# Patient Record
Sex: FEMALE | Race: WHITE | Employment: FULL TIME | URBAN - METROPOLITAN AREA
[De-identification: names, ages, dates, MRNs, and addresses within clinical notes are randomized per-mention and may not be internally consistent; named-entity substitution may affect disease eponyms.]

---

## 2018-09-22 ENCOUNTER — OFFICE VISIT (OUTPATIENT)
Dept: OBGYN CLINIC | Facility: CLINIC | Age: 38
End: 2018-09-22
Payer: COMMERCIAL

## 2018-09-22 VITALS
HEART RATE: 103 BPM | HEIGHT: 63 IN | SYSTOLIC BLOOD PRESSURE: 121 MMHG | DIASTOLIC BLOOD PRESSURE: 78 MMHG | BODY MASS INDEX: 23.21 KG/M2 | WEIGHT: 131 LBS

## 2018-09-22 DIAGNOSIS — O09.521 ELDERLY MULTIGRAVIDA IN FIRST TRIMESTER: ICD-10-CM

## 2018-09-22 DIAGNOSIS — Z71.89 PRENATAL CONSULT: Primary | ICD-10-CM

## 2018-09-22 LAB
CONTROL LINE PRESENT WITH A CLEAR BACKGROUND (YES/NO): YES YES/NO
KIT LOT #: 0 NUMERIC
PREGNANCY TEST, URINE: POSITIVE

## 2018-09-22 PROCEDURE — 81025 URINE PREGNANCY TEST: CPT | Performed by: ADVANCED PRACTICE MIDWIFE

## 2018-09-22 NOTE — PROGRESS NOTES
Pt is currently 10 wks pregnancy. Pt. denies any medical conditions. Desires CNM care. Midwifery care & philosophy discussed. Practice guidelines discussed. Pt is appropriate for RN visit.

## 2018-09-25 ENCOUNTER — NURSE ONLY (OUTPATIENT)
Dept: OBGYN CLINIC | Facility: CLINIC | Age: 38
End: 2018-09-25
Payer: COMMERCIAL

## 2018-09-25 VITALS — BODY MASS INDEX: 23 KG/M2 | WEIGHT: 131.19 LBS

## 2018-09-25 DIAGNOSIS — O09.521 ELDERLY MULTIGRAVIDA IN FIRST TRIMESTER: Primary | ICD-10-CM

## 2018-09-25 DIAGNOSIS — Z34.90 PREGNANCY, UNSPECIFIED GESTATIONAL AGE: ICD-10-CM

## 2018-09-25 NOTE — PROGRESS NOTES
Nurse education complete & information given to pt. Sabine Leija at visit. Pt AMA. TSH, 1hr gtt, HA1C ordered w/ NOB labs. Cell free DNA desired, order entered & phone number to schedule given to pt.  Pt states last pap was 5 years ago & will obtain copy of resu

## 2018-09-29 ENCOUNTER — LAB ENCOUNTER (OUTPATIENT)
Dept: LAB | Age: 38
End: 2018-09-29
Attending: ADVANCED PRACTICE MIDWIFE
Payer: COMMERCIAL

## 2018-09-29 DIAGNOSIS — O09.521 ELDERLY MULTIGRAVIDA IN FIRST TRIMESTER: ICD-10-CM

## 2018-09-29 DIAGNOSIS — Z34.90 PREGNANCY, UNSPECIFIED GESTATIONAL AGE: ICD-10-CM

## 2018-09-29 PROCEDURE — 87186 SC STD MICRODIL/AGAR DIL: CPT

## 2018-09-29 PROCEDURE — 87088 URINE BACTERIA CULTURE: CPT

## 2018-09-29 PROCEDURE — 86803 HEPATITIS C AB TEST: CPT

## 2018-09-29 PROCEDURE — 82950 GLUCOSE TEST: CPT

## 2018-09-29 PROCEDURE — 83036 HEMOGLOBIN GLYCOSYLATED A1C: CPT

## 2018-09-29 PROCEDURE — 36415 COLL VENOUS BLD VENIPUNCTURE: CPT

## 2018-09-29 PROCEDURE — 87086 URINE CULTURE/COLONY COUNT: CPT

## 2018-09-29 PROCEDURE — 86900 BLOOD TYPING SEROLOGIC ABO: CPT

## 2018-09-29 PROCEDURE — 86780 TREPONEMA PALLIDUM: CPT

## 2018-09-29 PROCEDURE — 87340 HEPATITIS B SURFACE AG IA: CPT

## 2018-09-29 PROCEDURE — 86762 RUBELLA ANTIBODY: CPT

## 2018-09-29 PROCEDURE — 86850 RBC ANTIBODY SCREEN: CPT

## 2018-09-29 PROCEDURE — 84443 ASSAY THYROID STIM HORMONE: CPT

## 2018-09-29 PROCEDURE — 86901 BLOOD TYPING SEROLOGIC RH(D): CPT

## 2018-09-29 PROCEDURE — 85025 COMPLETE CBC W/AUTO DIFF WBC: CPT

## 2018-09-29 PROCEDURE — 87389 HIV-1 AG W/HIV-1&-2 AB AG IA: CPT

## 2018-10-02 ENCOUNTER — TELEPHONE (OUTPATIENT)
Dept: OBGYN CLINIC | Facility: CLINIC | Age: 38
End: 2018-10-02

## 2018-10-02 DIAGNOSIS — R79.89 ELEVATED TSH: Primary | ICD-10-CM

## 2018-10-02 PROBLEM — O23.41 URINARY TRACT INFECTION IN MOTHER DURING FIRST TRIMESTER OF PREGNANCY: Status: ACTIVE | Noted: 2018-10-02

## 2018-10-02 PROBLEM — Z67.91 RH NEGATIVE STATE IN ANTEPARTUM PERIOD: Status: ACTIVE | Noted: 2018-10-02

## 2018-10-02 PROBLEM — O26.899 RH NEGATIVE STATE IN ANTEPARTUM PERIOD: Status: ACTIVE | Noted: 2018-10-02

## 2018-10-02 RX ORDER — CEPHALEXIN 500 MG/1
500 CAPSULE ORAL 4 TIMES DAILY
Qty: 28 CAPSULE | Refills: 0 | Status: SHIPPED | OUTPATIENT
Start: 2018-10-02 | End: 2018-10-03

## 2018-10-02 NOTE — TELEPHONE ENCOUNTER
----- [facility] sent at 10/2/2018 10:46 AM CDT -----  Pt has UTI, I have sent in Rx for Keflex. She will need CRAIG after finishes Rx. Also her TSH is high and needs to see Endocrinology.  She is Rh negative so will need Rhogam at 2

## 2018-10-02 NOTE — TELEPHONE ENCOUNTER
----- Message from Floyd Bazzi CNM sent at 10/2/2018 10:46 AM CDT -----  Pt has UTI, I have sent in Rx for Keflex. She will need CRAIG after finishes Rx. Also her TSH is high and needs to see Endocrinology.  She is Rh negative so will need Rhogam at 2

## 2018-10-03 ENCOUNTER — INITIAL PRENATAL (OUTPATIENT)
Dept: OBGYN CLINIC | Facility: CLINIC | Age: 38
End: 2018-10-03
Payer: COMMERCIAL

## 2018-10-03 VITALS
WEIGHT: 134 LBS | BODY MASS INDEX: 24 KG/M2 | SYSTOLIC BLOOD PRESSURE: 96 MMHG | DIASTOLIC BLOOD PRESSURE: 62 MMHG | HEART RATE: 73 BPM

## 2018-10-03 DIAGNOSIS — O09.529 ANTEPARTUM MULTIGRAVIDA OF ADVANCED MATERNAL AGE: ICD-10-CM

## 2018-10-03 DIAGNOSIS — E07.9 THYROID DYSFUNCTION IN PREGNANCY, ANTEPARTUM, FIRST TRIMESTER: ICD-10-CM

## 2018-10-03 DIAGNOSIS — Z34.01 ENCOUNTER FOR SUPERVISION OF NORMAL FIRST PREGNANCY IN FIRST TRIMESTER: Primary | ICD-10-CM

## 2018-10-03 DIAGNOSIS — O99.281 THYROID DYSFUNCTION IN PREGNANCY, ANTEPARTUM, FIRST TRIMESTER: ICD-10-CM

## 2018-10-03 DIAGNOSIS — Z11.3 SCREEN FOR STD (SEXUALLY TRANSMITTED DISEASE): ICD-10-CM

## 2018-10-03 DIAGNOSIS — Z12.4 SCREENING FOR MALIGNANT NEOPLASM OF CERVIX: ICD-10-CM

## 2018-10-03 PROCEDURE — 81002 URINALYSIS NONAUTO W/O SCOPE: CPT | Performed by: ADVANCED PRACTICE MIDWIFE

## 2018-10-03 PROCEDURE — 90686 IIV4 VACC NO PRSV 0.5 ML IM: CPT | Performed by: ADVANCED PRACTICE MIDWIFE

## 2018-10-03 PROCEDURE — 90471 IMMUNIZATION ADMIN: CPT | Performed by: ADVANCED PRACTICE MIDWIFE

## 2018-10-03 NOTE — TELEPHONE ENCOUNTER
Unable to lm, mailbox full. Pt has appt w/ MES today.  Info added to appt notes for MES to discuss w/ pt

## 2018-10-05 NOTE — PROGRESS NOTES
NOB labs reviewed. Urine culture results reviewed- pt to start abx. TSH reviewed- referral to endo. RH neg-pt understands management. AMA discuissed and genetic screen and practice heladio - has M appt.   All questions answered  Warning signs revie

## 2018-10-08 ENCOUNTER — TELEPHONE (OUTPATIENT)
Dept: PERINATAL CARE | Facility: HOSPITAL | Age: 38
End: 2018-10-08

## 2018-10-10 ENCOUNTER — HOSPITAL ENCOUNTER (OUTPATIENT)
Dept: PERINATAL CARE | Facility: HOSPITAL | Age: 38
Discharge: HOME | End: 2018-10-10
Attending: ADVANCED PRACTICE MIDWIFE
Payer: COMMERCIAL

## 2018-10-10 ENCOUNTER — HOSPITAL ENCOUNTER (OUTPATIENT)
Dept: PERINATAL CARE | Facility: HOSPITAL | Age: 38
Discharge: HOME | End: 2018-10-10
Attending: OBSTETRICS & GYNECOLOGY
Payer: COMMERCIAL

## 2018-10-10 VITALS — DIASTOLIC BLOOD PRESSURE: 68 MMHG | SYSTOLIC BLOOD PRESSURE: 124 MMHG | HEART RATE: 82 BPM

## 2018-10-10 DIAGNOSIS — Z36.9 FIRST TRIMESTER SCREENING: Primary | ICD-10-CM

## 2018-10-10 DIAGNOSIS — O09.511 AMA (ADVANCED MATERNAL AGE) PRIMIGRAVIDA 35+, FIRST TRIMESTER: ICD-10-CM

## 2018-10-10 DIAGNOSIS — Z36.9 FIRST TRIMESTER SCREENING: ICD-10-CM

## 2018-10-10 PROCEDURE — 76813 OB US NUCHAL MEAS 1 GEST: CPT | Performed by: OBSTETRICS & GYNECOLOGY

## 2018-10-10 PROCEDURE — 99243 OFF/OP CNSLTJ NEW/EST LOW 30: CPT | Performed by: OBSTETRICS & GYNECOLOGY

## 2018-10-10 NOTE — PROGRESS NOTES
Reason for Consult:   Dear Ms. Nadja Ortiz,    Thank you for requesting ultrasound evaluation and maternal fetal medicine consultation on iLnda Coles.  As you are aware she is a 45year old female with a Garcia pregnancy at 13w0d.   A maternal-fet of women aged 28 to 39, but also to later marriage, second marriage, the availability of better contraceptive options, and wider opportunities for further education and career advancement.     Studies have generally shown good pregnancy outcomes in women of medical problems complicating pregnancy are hypertension and diabetes.    The incidence of preeclampsia in the general obstetric population is 3 to 4 percent; this increases to 5 to 10 percent in women over age 36 and is as high as 28 percent in women over in PACS  FIRST TRIMESTER SCREENING:    The [address] is consistent with the gestational age. The nuchal translucency measures  1.4 mm. This is within normal limits. The nasal bone is present. Fetus: arms and legs seen suboptimally.  Fetal head/skull and abdome

## 2018-10-15 ENCOUNTER — TELEPHONE (OUTPATIENT)
Dept: PERINATAL CARE | Facility: HOSPITAL | Age: 38
End: 2018-10-15

## 2018-10-15 NOTE — TELEPHONE ENCOUNTER
Recd FTS results for [facility] and Dr Anisa Reis reviewed and signed off  Spoke with Monet Bourne and informed her that the probability after screening for Trisomy 13,18 and 21 is less than 1/10,000. This is low probability. Pt verbalized understanding.   Adarsh He

## 2018-10-26 ENCOUNTER — APPOINTMENT (OUTPATIENT)
Dept: LAB | Facility: HOSPITAL | Age: 38
End: 2018-10-26
Attending: INTERNAL MEDICINE
Payer: COMMERCIAL

## 2018-10-26 ENCOUNTER — OFFICE VISIT (OUTPATIENT)
Dept: ENDOCRINOLOGY CLINIC | Facility: CLINIC | Age: 38
End: 2018-10-26
Payer: COMMERCIAL

## 2018-10-26 ENCOUNTER — TELEPHONE (OUTPATIENT)
Dept: ENDOCRINOLOGY CLINIC | Facility: CLINIC | Age: 38
End: 2018-10-26

## 2018-10-26 VITALS
SYSTOLIC BLOOD PRESSURE: 120 MMHG | WEIGHT: 135.81 LBS | DIASTOLIC BLOOD PRESSURE: 76 MMHG | HEART RATE: 86 BPM | BODY MASS INDEX: 24 KG/M2

## 2018-10-26 DIAGNOSIS — O99.282 THYROID DISEASE DURING PREGNANCY IN SECOND TRIMESTER: Primary | ICD-10-CM

## 2018-10-26 DIAGNOSIS — O99.282 THYROID DISEASE DURING PREGNANCY IN SECOND TRIMESTER: ICD-10-CM

## 2018-10-26 DIAGNOSIS — E07.9 THYROID DISEASE DURING PREGNANCY IN SECOND TRIMESTER: Primary | ICD-10-CM

## 2018-10-26 DIAGNOSIS — E07.9 THYROID DISEASE DURING PREGNANCY IN SECOND TRIMESTER: ICD-10-CM

## 2018-10-26 PROCEDURE — 99243 OFF/OP CNSLTJ NEW/EST LOW 30: CPT | Performed by: INTERNAL MEDICINE

## 2018-10-26 PROCEDURE — 99212 OFFICE O/P EST SF 10 MIN: CPT | Performed by: INTERNAL MEDICINE

## 2018-10-26 PROCEDURE — 86376 MICROSOMAL ANTIBODY EACH: CPT

## 2018-10-26 PROCEDURE — 36415 COLL VENOUS BLD VENIPUNCTURE: CPT

## 2018-10-26 NOTE — TELEPHONE ENCOUNTER
[facility] calling for pt who is there now. Pls place orders, pt was seen today, will attempt to transfer, if not pls call at:[phone],thanks.

## 2018-10-26 NOTE — H&P
New Patient Evaluation - History and Physical    CONSULT - Reason for Visit:  Thyroid disease in  Pregnancy  Requesting Provider: Marilynn Shankar CNM    CHIEF COMPLAINT:  Patient presents with:  Consult: Pt had labs done 10/03/18.  Pt states high Thyroi Caffeine Concern: No        Occupational Exposure: Not Asked        Hobby Hazards: No        Sleep Concern: Not Asked        Stress Concern: No        Weight Concern: Not Asked        Special Diet: Yes          vegetarian        Back Care: Not Asked tenderness  HEMATOLOGIC/LYMPHATICS:  no cervical lymphadenopathy and no supraclavicular lymphadenopathy  LUNGS: clear to auscultation bilaterally, no crackles or wheezing  CARDIOVASCULAR:  regular rate and rhythm, normal S1 and S2  ABDOMEN:  normal bowel s

## 2018-10-27 ENCOUNTER — TELEPHONE (OUTPATIENT)
Dept: ENDOCRINOLOGY CLINIC | Facility: CLINIC | Age: 38
End: 2018-10-27

## 2018-10-27 DIAGNOSIS — E03.9 HYPOTHYROID IN PREGNANCY, ANTEPARTUM, UNSPECIFIED TRIMESTER: Primary | ICD-10-CM

## 2018-10-27 DIAGNOSIS — O99.280 HYPOTHYROID IN PREGNANCY, ANTEPARTUM, UNSPECIFIED TRIMESTER: Primary | ICD-10-CM

## 2018-10-27 NOTE — TELEPHONE ENCOUNTER
Patient is pregnant with a slight TSH elevation  Her TPO AB is positive  Hence I do recommend a small dose of LT 4 during pregnancy  LT 4 25 mcg daily    TSH and Free T4 in 4 weeks  Thanks

## 2018-10-30 RX ORDER — LEVOTHYROXINE SODIUM 0.03 MG/1
25 TABLET ORAL
Qty: 30 TABLET | Refills: 2 | Status: SHIPPED | OUTPATIENT
Start: 2018-10-30 | End: 2019-02-07

## 2018-10-30 NOTE — TELEPHONE ENCOUNTER
Spoke with patient and discussed note below from provider. Pt verbalized understanding to start levothyroxine 25 mcg daily 30-60 mins before bkfst and 4 hrs apart from prenatal vitamin. Pt verbalized understanding to repeat thyroid labs in 4 wks.  Med and L

## 2018-11-03 ENCOUNTER — ROUTINE PRENATAL (OUTPATIENT)
Dept: OBGYN CLINIC | Facility: CLINIC | Age: 38
End: 2018-11-03
Payer: COMMERCIAL

## 2018-11-03 ENCOUNTER — APPOINTMENT (OUTPATIENT)
Dept: LAB | Facility: HOSPITAL | Age: 38
End: 2018-11-03
Attending: ADVANCED PRACTICE MIDWIFE
Payer: COMMERCIAL

## 2018-11-03 VITALS
HEIGHT: 63 IN | WEIGHT: 138.13 LBS | SYSTOLIC BLOOD PRESSURE: 115 MMHG | HEART RATE: 90 BPM | DIASTOLIC BLOOD PRESSURE: 71 MMHG | BODY MASS INDEX: 24.47 KG/M2

## 2018-11-03 DIAGNOSIS — Z34.02 ENCOUNTER FOR SUPERVISION OF NORMAL FIRST PREGNANCY IN SECOND TRIMESTER: ICD-10-CM

## 2018-11-03 DIAGNOSIS — Z34.02 ENCOUNTER FOR SUPERVISION OF NORMAL FIRST PREGNANCY IN SECOND TRIMESTER: Primary | ICD-10-CM

## 2018-11-03 PROCEDURE — 81002 URINALYSIS NONAUTO W/O SCOPE: CPT | Performed by: ADVANCED PRACTICE MIDWIFE

## 2018-11-03 PROCEDURE — 82105 ALPHA-FETOPROTEIN SERUM: CPT

## 2018-11-03 PROCEDURE — 87086 URINE CULTURE/COLONY COUNT: CPT

## 2018-11-03 PROCEDURE — 36415 COLL VENOUS BLD VENIPUNCTURE: CPT

## 2018-11-04 NOTE — PROGRESS NOTES
Feels well, denies any cramping or VB. Denies dysuria, urinary frequency, or hematuria. UTI CRAIG today. Saw endo and is now taking 25mcg Synthroid daily. Endo recommends repeat thyroid labs 4 weeks from the last one. Desires AFP, will go to lab today.  Bunnie Hashimoto

## 2018-11-05 ENCOUNTER — TELEPHONE (OUTPATIENT)
Dept: OBGYN CLINIC | Facility: CLINIC | Age: 38
End: 2018-11-05

## 2018-11-05 DIAGNOSIS — O23.40 URINARY TRACT INFECTION IN MOTHER DURING PREGNANCY, ANTEPARTUM: Primary | ICD-10-CM

## 2018-11-05 NOTE — TELEPHONE ENCOUNTER
----- Message from Pippa Jaime CNM sent at 11/4/2018  5:22 PM CST -----  Please let pt know that urine Cx looks contaminated. I would like her to repeat. Looks like she lives in [place], so she can use that lab if it is more convenient.  Thanks, MJ

## 2018-11-08 ENCOUNTER — TELEPHONE (OUTPATIENT)
Dept: ENDOCRINOLOGY CLINIC | Facility: CLINIC | Age: 38
End: 2018-11-08

## 2018-11-08 NOTE — TELEPHONE ENCOUNTER
Spoke with patient. She is about 16 weeks pregnant. She was started on LT4 25mcg on 10/27. Per AM TPO antibody was positive with slight TSH elevated. Patient has noticed increase in irritability and anxiety since starting medication.  Denies any other symp

## 2018-11-08 NOTE — TELEPHONE ENCOUNTER
There are two options   Stop and repeat labs in 2-3 weeks  Change to every other day dosing and repeat labs in 3-4 weeks. If she experiences SE on every other day dosing, will have to stop.    Either way, david book an apt according to when she will be doigracie

## 2018-11-09 ENCOUNTER — APPOINTMENT (OUTPATIENT)
Dept: LAB | Age: 38
End: 2018-11-09
Attending: ADVANCED PRACTICE MIDWIFE
Payer: COMMERCIAL

## 2018-11-09 ENCOUNTER — TELEPHONE (OUTPATIENT)
Dept: OBGYN CLINIC | Facility: CLINIC | Age: 38
End: 2018-11-09

## 2018-11-09 DIAGNOSIS — E03.9 HYPOTHYROID IN PREGNANCY, ANTEPARTUM, UNSPECIFIED TRIMESTER: ICD-10-CM

## 2018-11-09 DIAGNOSIS — O23.40 URINARY TRACT INFECTION IN MOTHER DURING PREGNANCY, ANTEPARTUM: ICD-10-CM

## 2018-11-09 DIAGNOSIS — O99.280 HYPOTHYROID IN PREGNANCY, ANTEPARTUM, UNSPECIFIED TRIMESTER: ICD-10-CM

## 2018-11-09 PROCEDURE — 87086 URINE CULTURE/COLONY COUNT: CPT

## 2018-11-09 NOTE — TELEPHONE ENCOUNTER
----- Message from Floyd Bazzi CNM sent at 11/9/2018 11:03 AM CST -----  Please notify of negative AFP.  Thanks, MJ

## 2018-11-09 NOTE — TELEPHONE ENCOUNTER
Spoke with patient - she would like to try every other day dosing. Labs are already in her chart. She will call and let us know if symptoms return. She verbalizes understanding of plan to repeat thyroid labs.

## 2018-11-12 ENCOUNTER — TELEPHONE (OUTPATIENT)
Dept: OBGYN CLINIC | Facility: CLINIC | Age: 38
End: 2018-11-12

## 2018-11-12 NOTE — TELEPHONE ENCOUNTER
----- Message from DAVID Hodge sent at 11/11/2018  5:29 AM CST -----  Please notify that urine test of cure is negative

## 2018-11-21 ENCOUNTER — APPOINTMENT (OUTPATIENT)
Dept: LAB | Age: 38
End: 2018-11-21
Attending: INTERNAL MEDICINE
Payer: COMMERCIAL

## 2018-11-21 PROCEDURE — 36415 COLL VENOUS BLD VENIPUNCTURE: CPT

## 2018-11-21 PROCEDURE — 84443 ASSAY THYROID STIM HORMONE: CPT

## 2018-11-21 PROCEDURE — 84439 ASSAY OF FREE THYROXINE: CPT

## 2018-11-23 ENCOUNTER — TELEPHONE (OUTPATIENT)
Dept: MEDSURG UNIT | Facility: HOSPITAL | Age: 38
End: 2018-11-23

## 2018-11-23 DIAGNOSIS — E03.9 HYPOTHYROIDISM, UNSPECIFIED TYPE: Primary | ICD-10-CM

## 2018-11-23 NOTE — TELEPHONE ENCOUNTER
Spoke with patient. And discussed below. She actually stopped medication completely and is not taking at all. States LT4 makes her feel anxious and irritable. Advised that for safety of baby she does need to take medication.  Will confirm with AM if she wou

## 2018-11-23 NOTE — TELEPHONE ENCOUNTER
Pregnant patient   TSH is high at 3.5 ( for pregnancy)  She was started on LT 4 25 mcg daily  Did not feel good and dose was changed to every other day.    Since TSH is high that goal, recommend that she try to take the medication 5 days a week  Call if she

## 2018-11-23 NOTE — TELEPHONE ENCOUNTER
Every other day .   She could start with twice a week for one week, if she tokeartes it ca arguello up to every other day from second week  Thanks

## 2018-11-23 NOTE — TELEPHONE ENCOUNTER
Spoke with patient. She is very hesitant to restart LT4 because of previous SE. RN advised patient that during pregnancy thyroid levels are closely monitored and taking medication is for the safety of the pregnancy.  Patient states she will try LT4 twice a

## 2018-11-27 NOTE — PROGRESS NOTES
Brice Gil  Dear Dr. Dariusz Israel,     Thank you for requesting ultrasound evaluation and maternal fetal medicine consultation on your patient Terry Cm.  As you are aware she is a 45year old female  with a Sin pregnancy surveillance is advised for these patients including a comprehensive ultrasound to assess for fetal malformations (at 20 weeks) and a third trimester ultrasound assessment for fetal growth (at 32 weeks).  In addition, weekly NST's (initiating at 3 antepartum testing in older women does increase the chance that a women will be induced (71 inductions per fetal death averted) and thereby increases her risk of having a  delivery, only 14 additional cesareans would need to be performed to avert o gestational hypertension  ·Placental abruption  ·Nonreassuring fetal heart rate tracing  · delivery, including very  delivery (before 32 weeks)  ·Low birth weight  ·Increased rate of  section  · morbidity and mortality  ·Neur ultrasound  · Thyroid dysfunction  · Advanced maternal age, low risk cfDNA screen     RECOMMENDATIONS:  · Continue care with MsDiana Virginia Barraza  · Weekly NST at 36 weeks  · Third trimester US  · Thyroid labs to be managed by Dr. Bacilio Tenorio     Thank you for allo

## 2018-11-30 ENCOUNTER — HOSPITAL ENCOUNTER (OUTPATIENT)
Dept: PERINATAL CARE | Facility: HOSPITAL | Age: 38
Discharge: HOME | End: 2018-11-30
Attending: OBSTETRICS & GYNECOLOGY
Payer: COMMERCIAL

## 2018-11-30 ENCOUNTER — HOSPITAL ENCOUNTER (OUTPATIENT)
Dept: PERINATAL CARE | Facility: HOSPITAL | Age: 38
Discharge: HOME | End: 2018-11-30
Attending: ADVANCED PRACTICE MIDWIFE
Payer: COMMERCIAL

## 2018-11-30 VITALS
HEART RATE: 104 BPM | SYSTOLIC BLOOD PRESSURE: 115 MMHG | WEIGHT: 138 LBS | BODY MASS INDEX: 24 KG/M2 | DIASTOLIC BLOOD PRESSURE: 79 MMHG

## 2018-11-30 DIAGNOSIS — O26.899 RH NEGATIVE STATE IN ANTEPARTUM PERIOD: ICD-10-CM

## 2018-11-30 DIAGNOSIS — O09.529 ANTEPARTUM MULTIGRAVIDA OF ADVANCED MATERNAL AGE: Primary | ICD-10-CM

## 2018-11-30 DIAGNOSIS — O09.512 PRIMIGRAVIDA OF ADVANCED MATERNAL AGE IN SECOND TRIMESTER: ICD-10-CM

## 2018-11-30 DIAGNOSIS — R79.89 ELEVATED TSH: ICD-10-CM

## 2018-11-30 DIAGNOSIS — Z36.3 ENCOUNTER FOR ANTENATAL SCREENING FOR MALFORMATION USING ULTRASOUND: ICD-10-CM

## 2018-11-30 DIAGNOSIS — O09.529 ANTEPARTUM MULTIGRAVIDA OF ADVANCED MATERNAL AGE: ICD-10-CM

## 2018-11-30 DIAGNOSIS — Z67.91 RH NEGATIVE STATE IN ANTEPARTUM PERIOD: ICD-10-CM

## 2018-11-30 PROCEDURE — 99215 OFFICE O/P EST HI 40 MIN: CPT | Performed by: OBSTETRICS & GYNECOLOGY

## 2018-11-30 PROCEDURE — 76811 OB US DETAILED SNGL FETUS: CPT | Performed by: OBSTETRICS & GYNECOLOGY

## 2018-12-07 ENCOUNTER — ROUTINE PRENATAL (OUTPATIENT)
Dept: OBGYN CLINIC | Facility: CLINIC | Age: 38
End: 2018-12-07
Payer: COMMERCIAL

## 2018-12-07 VITALS
HEART RATE: 97 BPM | BODY MASS INDEX: 25 KG/M2 | DIASTOLIC BLOOD PRESSURE: 79 MMHG | SYSTOLIC BLOOD PRESSURE: 120 MMHG | WEIGHT: 141.63 LBS

## 2018-12-07 DIAGNOSIS — Z34.02 ENCOUNTER FOR SUPERVISION OF NORMAL FIRST PREGNANCY IN SECOND TRIMESTER: Primary | ICD-10-CM

## 2018-12-07 PROCEDURE — 81002 URINALYSIS NONAUTO W/O SCOPE: CPT | Performed by: ADVANCED PRACTICE MIDWIFE

## 2018-12-07 NOTE — PROGRESS NOTES
Thinks feels some movement. Denies pain or bleeding. Was unable to tolerate synthroid, made her heart race and unable to sleep. Talked with Dr Burgess Bañuelos who recommended trying every other day or twice weekly.  She was still unable to tolerate so stopped med

## 2018-12-11 ENCOUNTER — TELEPHONE (OUTPATIENT)
Dept: OBGYN CLINIC | Facility: CLINIC | Age: 38
End: 2018-12-11

## 2018-12-11 DIAGNOSIS — E03.9 HYPOTHYROIDISM, UNSPECIFIED TYPE: Primary | ICD-10-CM

## 2018-12-21 ENCOUNTER — APPOINTMENT (OUTPATIENT)
Dept: LAB | Age: 38
End: 2018-12-21
Attending: INTERNAL MEDICINE
Payer: COMMERCIAL

## 2018-12-21 DIAGNOSIS — E03.9 HYPOTHYROIDISM, UNSPECIFIED TYPE: ICD-10-CM

## 2018-12-21 PROCEDURE — 84439 ASSAY OF FREE THYROXINE: CPT

## 2018-12-21 PROCEDURE — 84443 ASSAY THYROID STIM HORMONE: CPT

## 2018-12-21 PROCEDURE — 36415 COLL VENOUS BLD VENIPUNCTURE: CPT

## 2018-12-24 ENCOUNTER — TELEPHONE (OUTPATIENT)
Dept: ENDOCRINOLOGY CLINIC | Facility: CLINIC | Age: 38
End: 2018-12-24

## 2018-12-24 NOTE — TELEPHONE ENCOUNTER
Endo staff:  Seems like she had decided to take LT 4 a few days a week  Recent TSH is 3.1 which is okay for third trimester. Ideally, I recommend LT 4 during pregnancy with high TSH and keeping TSH under 3 in T2 and T3. However, patient is hesitant.

## 2019-01-07 NOTE — TELEPHONE ENCOUNTER
RN spoke with patient and discussed note below from provider. RN advised pt that TSH 3.1 on 12/21/18 and Dr. Burgess Bañuelos recommends TSH less than 3.0 during pregnancy.      Pt states she is not currently taking levothyroxine and she is frankly not going to ta

## 2019-01-10 ENCOUNTER — ROUTINE PRENATAL (OUTPATIENT)
Dept: OBGYN CLINIC | Facility: CLINIC | Age: 39
End: 2019-01-10
Payer: COMMERCIAL

## 2019-01-10 VITALS
SYSTOLIC BLOOD PRESSURE: 126 MMHG | DIASTOLIC BLOOD PRESSURE: 68 MMHG | HEIGHT: 63 IN | BODY MASS INDEX: 26.09 KG/M2 | HEART RATE: 80 BPM | WEIGHT: 147.25 LBS

## 2019-01-10 DIAGNOSIS — Z34.02 ENCOUNTER FOR SUPERVISION OF NORMAL FIRST PREGNANCY IN SECOND TRIMESTER: Primary | ICD-10-CM

## 2019-01-10 LAB
APPEARANCE: CLEAR
MULTISTIX LOT#: NORMAL NUMERIC
PH, URINE: 6 (ref 4.5–8)
SPECIFIC GRAVITY: 1.02 (ref 1–1.03)
URINE-COLOR: YELLOW
UROBILINOGEN,SEMI-QN: 0.2 MG/DL (ref 0–1.9)

## 2019-01-10 PROCEDURE — 81002 URINALYSIS NONAUTO W/O SCOPE: CPT | Performed by: ADVANCED PRACTICE MIDWIFE

## 2019-01-11 NOTE — PROGRESS NOTES
Discussed pt d/c Synthroid. Medication made her feel very off/anxious. Encouraged patient to have labs repeated with 3rd T labs. She is willing to do.   Also discussed alternatives to Synthroid if labs have become significantly abnormal, and risks to fet

## 2019-01-21 ENCOUNTER — APPOINTMENT (OUTPATIENT)
Dept: LAB | Age: 39
End: 2019-01-21
Attending: ADVANCED PRACTICE MIDWIFE
Payer: COMMERCIAL

## 2019-01-21 DIAGNOSIS — Z34.02 ENCOUNTER FOR SUPERVISION OF NORMAL FIRST PREGNANCY IN SECOND TRIMESTER: ICD-10-CM

## 2019-01-21 LAB
ANTIBODY SCREEN: NEGATIVE
ERYTHROCYTE [DISTWIDTH] IN BLOOD BY AUTOMATED COUNT: 12.5 % (ref 11–15)
GLUCOSE 1H P 50 G GLC PO SERPL-MCNC: 131 MG/DL
HCT VFR BLD AUTO: 32.8 % (ref 35–48)
HGB BLD-MCNC: 11.1 G/DL (ref 12–16)
MCH RBC QN AUTO: 30.5 PG (ref 27–32)
MCHC RBC AUTO-ENTMCNC: 34 G/DL (ref 32–37)
MCV RBC AUTO: 89.7 FL (ref 80–100)
PLATELET # BLD AUTO: 258 K/UL (ref 140–400)
PMV BLD AUTO: 8.5 FL (ref 7.4–10.3)
RBC # BLD AUTO: 3.66 M/UL (ref 3.7–5.4)
RH BLOOD TYPE: NEGATIVE
TSH SERPL-ACNC: 2.69 UIU/ML (ref 0.45–5.33)
WBC # BLD AUTO: 10.2 K/UL (ref 4–11)

## 2019-01-21 PROCEDURE — 85027 COMPLETE CBC AUTOMATED: CPT

## 2019-01-21 PROCEDURE — 86901 BLOOD TYPING SEROLOGIC RH(D): CPT

## 2019-01-21 PROCEDURE — 82950 GLUCOSE TEST: CPT

## 2019-01-21 PROCEDURE — 86850 RBC ANTIBODY SCREEN: CPT

## 2019-01-21 PROCEDURE — 36415 COLL VENOUS BLD VENIPUNCTURE: CPT

## 2019-01-21 PROCEDURE — 84443 ASSAY THYROID STIM HORMONE: CPT

## 2019-01-21 PROCEDURE — 87389 HIV-1 AG W/HIV-1&-2 AB AG IA: CPT

## 2019-01-21 PROCEDURE — 86900 BLOOD TYPING SEROLOGIC ABO: CPT

## 2019-01-21 PROCEDURE — 86780 TREPONEMA PALLIDUM: CPT

## 2019-01-22 LAB — HIV1+2 AB SERPL QL IA: NONREACTIVE

## 2019-01-23 ENCOUNTER — TELEPHONE (OUTPATIENT)
Dept: OBGYN CLINIC | Facility: CLINIC | Age: 39
End: 2019-01-23

## 2019-01-23 ENCOUNTER — ROUTINE PRENATAL (OUTPATIENT)
Dept: OBGYN CLINIC | Facility: CLINIC | Age: 39
End: 2019-01-23
Payer: COMMERCIAL

## 2019-01-23 VITALS
BODY MASS INDEX: 26.42 KG/M2 | WEIGHT: 149.13 LBS | HEART RATE: 147 BPM | HEIGHT: 63 IN | DIASTOLIC BLOOD PRESSURE: 82 MMHG | SYSTOLIC BLOOD PRESSURE: 147 MMHG

## 2019-01-23 DIAGNOSIS — Z34.03 ENCOUNTER FOR SUPERVISION OF NORMAL FIRST PREGNANCY IN THIRD TRIMESTER: Primary | ICD-10-CM

## 2019-01-23 DIAGNOSIS — R73.09 ABNORMAL GTT (GLUCOSE TOLERANCE TEST): ICD-10-CM

## 2019-01-23 LAB
APPEARANCE: CLEAR
MULTISTIX LOT#: NORMAL NUMERIC
PH, URINE: 5 (ref 4.5–8)
SPECIFIC GRAVITY: 1.03 (ref 1–1.03)
T PALLIDUM AB SER QL: NEGATIVE
URINE-COLOR: YELLOW
UROBILINOGEN,SEMI-QN: 0.2 MG/DL (ref 0–1.9)

## 2019-01-23 PROCEDURE — 81002 URINALYSIS NONAUTO W/O SCOPE: CPT | Performed by: ADVANCED PRACTICE MIDWIFE

## 2019-01-23 PROCEDURE — 90471 IMMUNIZATION ADMIN: CPT | Performed by: ADVANCED PRACTICE MIDWIFE

## 2019-01-23 PROCEDURE — 90715 TDAP VACCINE 7 YRS/> IM: CPT | Performed by: ADVANCED PRACTICE MIDWIFE

## 2019-01-23 PROCEDURE — 96372 THER/PROPH/DIAG INJ SC/IM: CPT | Performed by: ADVANCED PRACTICE MIDWIFE

## 2019-01-23 NOTE — TELEPHONE ENCOUNTER
Pt reports that when trying to set up [address] [place] [address] Soledad was told class is no longer available. Per Raven Roberts RN will forward email to Kyle Ruggiero who is in charge of classes and contact patient with correct information.

## 2019-01-23 NOTE — PROGRESS NOTES
Pt given Rhogam injection. Injection given in right upper outer quadrant. Pt tolerated well. Rhogam information sheet provided. Pt instructed to contact office with any questions or concerns. Pt verbalized understanding.

## 2019-01-23 NOTE — PROGRESS NOTES
Active fetus  No signs signs of PTL. Reviewed S&S of PTL  Discussed abnormal 1 hr result  Pt to have 3 hr GTT. Denies any palpations or SOB  Warning signs reviewed  All questions answered.   Repeat pulse 99

## 2019-01-26 ENCOUNTER — LABORATORY ENCOUNTER (OUTPATIENT)
Dept: LAB | Age: 39
End: 2019-01-26
Attending: ADVANCED PRACTICE MIDWIFE
Payer: COMMERCIAL

## 2019-01-26 DIAGNOSIS — R73.09 ABNORMAL GTT (GLUCOSE TOLERANCE TEST): ICD-10-CM

## 2019-01-26 LAB
EST. AVERAGE GLUCOSE BLD GHB EST-MCNC: 94 MG/DL (ref 68–126)
GLUCOSE 1H P GLC SERPL-MCNC: 154 MG/DL
GLUCOSE 2H P GLC SERPL-MCNC: 120 MG/DL
GLUCOSE 3H P GLC SERPL-MCNC: 107 MG/DL
GLUCOSE P FAST SERPL-MCNC: 87 MG/DL (ref 70–99)
HBA1C MFR BLD HPLC: 4.9 % (ref ?–5.7)

## 2019-01-26 PROCEDURE — 36415 COLL VENOUS BLD VENIPUNCTURE: CPT

## 2019-01-26 PROCEDURE — 83036 HEMOGLOBIN GLYCOSYLATED A1C: CPT

## 2019-01-26 PROCEDURE — 82952 GTT-ADDED SAMPLES: CPT

## 2019-01-26 PROCEDURE — 82951 GLUCOSE TOLERANCE TEST (GTT): CPT

## 2019-01-29 ENCOUNTER — TELEPHONE (OUTPATIENT)
Dept: OBGYN CLINIC | Facility: CLINIC | Age: 39
End: 2019-01-29

## 2019-02-07 ENCOUNTER — ROUTINE PRENATAL (OUTPATIENT)
Dept: OBGYN CLINIC | Facility: CLINIC | Age: 39
End: 2019-02-07
Payer: COMMERCIAL

## 2019-02-07 VITALS
SYSTOLIC BLOOD PRESSURE: 120 MMHG | BODY MASS INDEX: 27 KG/M2 | WEIGHT: 151 LBS | HEART RATE: 80 BPM | DIASTOLIC BLOOD PRESSURE: 62 MMHG

## 2019-02-07 DIAGNOSIS — Z34.03 ENCOUNTER FOR SUPERVISION OF NORMAL FIRST PREGNANCY IN THIRD TRIMESTER: Primary | ICD-10-CM

## 2019-02-07 LAB
APPEARANCE: CLEAR
MULTISTIX LOT#: NORMAL NUMERIC
PH, URINE: 7 (ref 4.5–8)
SPECIFIC GRAVITY: 1.01 (ref 1–1.03)
URINE-COLOR: YELLOW
UROBILINOGEN,SEMI-QN: 0 MG/DL (ref 0–1.9)

## 2019-02-07 PROCEDURE — 81002 URINALYSIS NONAUTO W/O SCOPE: CPT | Performed by: ADVANCED PRACTICE MIDWIFE

## 2019-02-08 NOTE — PROGRESS NOTES
Feeling well. +FM. Arm still mildly sore from TDAP at last visit, function normal.  No bruising or swelling noted. REv 28 week packet.   Pt with questions about AMA protocols

## 2019-02-11 ENCOUNTER — TELEPHONE (OUTPATIENT)
Dept: PERINATAL CARE | Facility: HOSPITAL | Age: 39
End: 2019-02-11

## 2019-02-12 ENCOUNTER — TELEPHONE (OUTPATIENT)
Dept: OBGYN CLINIC | Facility: CLINIC | Age: 39
End: 2019-02-12

## 2019-02-12 NOTE — TELEPHONE ENCOUNTER
Pt needs a letter stating pt pregnant and due date 4/17 to her job at [employer] - [phone]

## 2019-02-15 NOTE — PROGRESS NOTES
Kimberly Grier    Dear Ms. Gaviota Hayden    Thank you for requesting ultrasound evaluation and maternal fetal medicine consultation on your patient Torsten Wallis.  As you are aware she is a 45year old female [facility] with a s and coordination of care. Our discussion is summarized above. The approximate physician face-to-face time was 15 minutes.

## 2019-02-18 ENCOUNTER — HOSPITAL ENCOUNTER (OUTPATIENT)
Dept: PERINATAL CARE | Facility: HOSPITAL | Age: 39
Discharge: HOME | End: 2019-02-18
Attending: OBSTETRICS & GYNECOLOGY
Payer: COMMERCIAL

## 2019-02-18 ENCOUNTER — ROUTINE PRENATAL (OUTPATIENT)
Dept: OBGYN CLINIC | Facility: CLINIC | Age: 39
End: 2019-02-18
Payer: COMMERCIAL

## 2019-02-18 VITALS
HEIGHT: 63 IN | BODY MASS INDEX: 26.75 KG/M2 | WEIGHT: 151 LBS | SYSTOLIC BLOOD PRESSURE: 128 MMHG | HEART RATE: 114 BPM | DIASTOLIC BLOOD PRESSURE: 75 MMHG

## 2019-02-18 VITALS
BODY MASS INDEX: 27 KG/M2 | WEIGHT: 152.63 LBS | DIASTOLIC BLOOD PRESSURE: 71 MMHG | HEART RATE: 70 BPM | SYSTOLIC BLOOD PRESSURE: 108 MMHG

## 2019-02-18 DIAGNOSIS — Z34.03 ENCOUNTER FOR SUPERVISION OF NORMAL FIRST PREGNANCY IN THIRD TRIMESTER: Primary | ICD-10-CM

## 2019-02-18 DIAGNOSIS — O09.523 MULTIGRAVIDA OF ADVANCED MATERNAL AGE IN THIRD TRIMESTER: Primary | ICD-10-CM

## 2019-02-18 DIAGNOSIS — R79.89 ELEVATED TSH: ICD-10-CM

## 2019-02-18 DIAGNOSIS — O09.523 MULTIGRAVIDA OF ADVANCED MATERNAL AGE IN THIRD TRIMESTER: ICD-10-CM

## 2019-02-18 DIAGNOSIS — Z36.89 ENCOUNTER FOR ULTRASOUND TO ASSESS FETAL GROWTH: ICD-10-CM

## 2019-02-18 LAB
APPEARANCE: CLEAR
MULTISTIX LOT#: NORMAL NUMERIC
PH, URINE: 7 (ref 4.5–8)
SPECIFIC GRAVITY: 1 (ref 1–1.03)
URINE-COLOR: YELLOW
UROBILINOGEN,SEMI-QN: 0.2 MG/DL (ref 0–1.9)

## 2019-02-18 PROCEDURE — 99213 OFFICE O/P EST LOW 20 MIN: CPT | Performed by: OBSTETRICS & GYNECOLOGY

## 2019-02-18 PROCEDURE — 76805 OB US >/= 14 WKS SNGL FETUS: CPT | Performed by: OBSTETRICS & GYNECOLOGY

## 2019-02-18 PROCEDURE — 76819 FETAL BIOPHYS PROFIL W/O NST: CPT | Performed by: OBSTETRICS & GYNECOLOGY

## 2019-02-18 PROCEDURE — 81002 URINALYSIS NONAUTO W/O SCOPE: CPT | Performed by: ADVANCED PRACTICE MIDWIFE

## 2019-02-18 NOTE — PROGRESS NOTES
Feeling well, active baby. Denies s/s PTL including UCs, bleeding or LOF. Reviewed warning signs and importance of good FM.

## 2019-03-04 ENCOUNTER — ROUTINE PRENATAL (OUTPATIENT)
Dept: OBGYN CLINIC | Facility: CLINIC | Age: 39
End: 2019-03-04
Payer: COMMERCIAL

## 2019-03-04 VITALS
WEIGHT: 157.13 LBS | HEIGHT: 63 IN | SYSTOLIC BLOOD PRESSURE: 123 MMHG | DIASTOLIC BLOOD PRESSURE: 76 MMHG | BODY MASS INDEX: 27.84 KG/M2 | HEART RATE: 94 BPM

## 2019-03-04 DIAGNOSIS — Z34.03 ENCOUNTER FOR SUPERVISION OF NORMAL FIRST PREGNANCY IN THIRD TRIMESTER: Primary | ICD-10-CM

## 2019-03-04 PROCEDURE — 81002 URINALYSIS NONAUTO W/O SCOPE: CPT | Performed by: ADVANCED PRACTICE MIDWIFE

## 2019-03-07 ENCOUNTER — TELEPHONE (OUTPATIENT)
Dept: OBGYN CLINIC | Facility: CLINIC | Age: 39
End: 2019-03-07

## 2019-03-07 NOTE — TELEPHONE ENCOUNTER
PER PT REQUESTING A NOTE TO RETURN TO WORK ON MAY 31,2019 / PT STATE SHE'D DUE ON April 17TH / PLS FAX TO  DEPARTMENT # [phone] ATTN: Óscar Deng / [facility]

## 2019-03-11 NOTE — TELEPHONE ENCOUNTER
Minnie Beckman CNM  [facility]   Caller: Unspecified (4 days ago,  8:42 AM)             Yes - please clarify SAVANNAH and expected recovery 6 vs 8 weeks.       Letter created and faxed per pt's request.

## 2019-03-18 ENCOUNTER — ROUTINE PRENATAL (OUTPATIENT)
Dept: OBGYN CLINIC | Facility: CLINIC | Age: 39
End: 2019-03-18
Payer: COMMERCIAL

## 2019-03-18 VITALS
BODY MASS INDEX: 28.04 KG/M2 | HEART RATE: 96 BPM | SYSTOLIC BLOOD PRESSURE: 117 MMHG | DIASTOLIC BLOOD PRESSURE: 74 MMHG | WEIGHT: 158.25 LBS | HEIGHT: 63 IN

## 2019-03-18 DIAGNOSIS — Z34.03 ENCOUNTER FOR SUPERVISION OF NORMAL FIRST PREGNANCY IN THIRD TRIMESTER: Primary | ICD-10-CM

## 2019-03-18 LAB
APPEARANCE: CLEAR
MULTISTIX LOT#: NORMAL NUMERIC
PH, URINE: 7 (ref 4.5–8)
SPECIFIC GRAVITY: 1.01 (ref 1–1.03)
URINE-COLOR: YELLOW
UROBILINOGEN,SEMI-QN: 0.2 MG/DL (ref 0–1.9)

## 2019-03-18 PROCEDURE — 81002 URINALYSIS NONAUTO W/O SCOPE: CPT | Performed by: ADVANCED PRACTICE MIDWIFE

## 2019-03-18 NOTE — PROGRESS NOTES
Active baby. Planning vitK and EEO. GBS done today. Reviewed 36 week packet. Reviewed danger signs. NSTs with next visit.

## 2019-03-20 ENCOUNTER — TELEPHONE (OUTPATIENT)
Dept: OBGYN CLINIC | Facility: CLINIC | Age: 39
End: 2019-03-20

## 2019-03-20 NOTE — TELEPHONE ENCOUNTER
Left detailed message for pt advising of neg GBS cx per MBW & advising pt will not need abx in labor

## 2019-03-20 NOTE — TELEPHONE ENCOUNTER
----- Message from Paulino Cabezas CNM sent at 3/20/2019  2:38 PM CDT -----  GBS in neg. Please call to inform.

## 2019-03-27 ENCOUNTER — ROUTINE PRENATAL (OUTPATIENT)
Dept: OBGYN CLINIC | Facility: CLINIC | Age: 39
End: 2019-03-27
Payer: COMMERCIAL

## 2019-03-27 ENCOUNTER — HOSPITAL ENCOUNTER (OUTPATIENT)
Facility: HOSPITAL | Age: 39
Discharge: HOME | End: 2019-03-27
Attending: ADVANCED PRACTICE MIDWIFE | Admitting: OBSTETRICS & GYNECOLOGY
Payer: COMMERCIAL

## 2019-03-27 ENCOUNTER — APPOINTMENT (OUTPATIENT)
Dept: OBGYN CLINIC | Facility: HOSPITAL | Age: 39
End: 2019-03-27
Payer: COMMERCIAL

## 2019-03-27 VITALS
DIASTOLIC BLOOD PRESSURE: 71 MMHG | HEIGHT: 63 IN | BODY MASS INDEX: 28 KG/M2 | RESPIRATION RATE: 17 BRPM | SYSTOLIC BLOOD PRESSURE: 120 MMHG | HEART RATE: 96 BPM | TEMPERATURE: 98 F | WEIGHT: 158 LBS

## 2019-03-27 VITALS
DIASTOLIC BLOOD PRESSURE: 72 MMHG | HEART RATE: 97 BPM | WEIGHT: 161.38 LBS | BODY MASS INDEX: 28.59 KG/M2 | HEIGHT: 63 IN | SYSTOLIC BLOOD PRESSURE: 124 MMHG

## 2019-03-27 DIAGNOSIS — Z34.03 ENCOUNTER FOR SUPERVISION OF NORMAL FIRST PREGNANCY IN THIRD TRIMESTER: Primary | ICD-10-CM

## 2019-03-27 PROBLEM — Z34.90 PREGNANCY: Status: ACTIVE | Noted: 2019-03-27

## 2019-03-27 LAB
APPEARANCE: CLEAR
MULTISTIX LOT#: NORMAL NUMERIC
PH, URINE: 6 (ref 4.5–8)
SPECIFIC GRAVITY: 1.01 (ref 1–1.03)
URINE-COLOR: YELLOW
UROBILINOGEN,SEMI-QN: 0.2 MG/DL (ref 0–1.9)

## 2019-03-27 PROCEDURE — 59025 FETAL NON-STRESS TEST: CPT | Performed by: ADVANCED PRACTICE MIDWIFE

## 2019-03-27 PROCEDURE — 81002 URINALYSIS NONAUTO W/O SCOPE: CPT | Performed by: ADVANCED PRACTICE MIDWIFE

## 2019-03-27 NOTE — PROGRESS NOTES
Pt is a 45year old female admitted to TR1/TR1-A. Patient presents with:  Non-stress Test: AMA     Pt is  37w0d intra-uterine pregnancy. History obtained, consents signed. Oriented to room, staff, and plan of care.

## 2019-03-27 NOTE — PROGRESS NOTES
Discharged to home per ambulatory in stable condition with written and verbal instructions.  Patient verbalizes understanding of information given    Physician Evaluation      NST Interpretation: Reactive    Disposition:   Discharged    Comments:    Home wi Ok to schedule June or July if they would prefer to wait.

## 2019-03-28 NOTE — PROGRESS NOTES
Feeling well, active baby. Denies SOL including UCs, bleeding or LOF. Reviewed SOL, warning signs and importance of good FM. Reviewed patient's birth plan which is consistent with midwifery care routines.

## 2019-04-03 ENCOUNTER — HOSPITAL ENCOUNTER (OUTPATIENT)
Facility: HOSPITAL | Age: 39
Discharge: HOME | End: 2019-04-03
Attending: ADVANCED PRACTICE MIDWIFE | Admitting: OBSTETRICS & GYNECOLOGY
Payer: COMMERCIAL

## 2019-04-03 ENCOUNTER — APPOINTMENT (OUTPATIENT)
Dept: OBGYN CLINIC | Facility: HOSPITAL | Age: 39
End: 2019-04-03
Payer: COMMERCIAL

## 2019-04-03 ENCOUNTER — ROUTINE PRENATAL (OUTPATIENT)
Dept: OBGYN CLINIC | Facility: CLINIC | Age: 39
End: 2019-04-03
Payer: COMMERCIAL

## 2019-04-03 VITALS
DIASTOLIC BLOOD PRESSURE: 73 MMHG | SYSTOLIC BLOOD PRESSURE: 121 MMHG | HEART RATE: 90 BPM | HEIGHT: 63 IN | WEIGHT: 162.13 LBS | BODY MASS INDEX: 28.73 KG/M2

## 2019-04-03 DIAGNOSIS — Z34.03 ENCOUNTER FOR SUPERVISION OF NORMAL FIRST PREGNANCY IN THIRD TRIMESTER: Primary | ICD-10-CM

## 2019-04-03 PROBLEM — O09.529 AMA (ADVANCED MATERNAL AGE) MULTIGRAVIDA 35+, UNSPECIFIED TRIMESTER: Status: ACTIVE | Noted: 2019-02-18

## 2019-04-03 PROCEDURE — 59025 FETAL NON-STRESS TEST: CPT | Performed by: ADVANCED PRACTICE MIDWIFE

## 2019-04-03 PROCEDURE — 81002 URINALYSIS NONAUTO W/O SCOPE: CPT | Performed by: ADVANCED PRACTICE MIDWIFE

## 2019-04-03 NOTE — PROGRESS NOTES
Pt is a 45year old female admitted to TR5/TR5-A. Patient presents with:  Non-stress Test     Pt is  38w0d intra-uterine pregnancy. History obtained, consents signed. Oriented to room, staff, and plan of care.

## 2019-04-03 NOTE — PROGRESS NOTES
Active fetus Increasing BH contractions. Denies any leaking of fluid or bleeding. Reviewed S&S labor  Warning signs reviewed  All questions answered.  Reactive NST today

## 2019-04-03 NOTE — NST
Nonstress Test   Patient: Corey Aus    Gestation: 38w0d    NST:       Variability: Moderate           Accelerations: Yes           Decelerations: None            Baseline: 135 BPM           Uterine Irritability: Yes           Contractions: Not pre

## 2019-04-03 NOTE — PROGRESS NOTES
Discharged to home per ambulatory in stable condition with written and verbal instructions. Patient verbalizes understanding of information given.     Physician Evaluation      NST Interpretation: Reactive    Disposition:   Discharged    Comments:    [address] [facility] as

## 2019-04-10 ENCOUNTER — HOSPITAL ENCOUNTER (OUTPATIENT)
Facility: HOSPITAL | Age: 39
Discharge: HOME | End: 2019-04-10
Attending: ADVANCED PRACTICE MIDWIFE | Admitting: OBSTETRICS & GYNECOLOGY
Payer: COMMERCIAL

## 2019-04-10 ENCOUNTER — ROUTINE PRENATAL (OUTPATIENT)
Dept: OBGYN CLINIC | Facility: CLINIC | Age: 39
End: 2019-04-10
Payer: COMMERCIAL

## 2019-04-10 ENCOUNTER — APPOINTMENT (OUTPATIENT)
Dept: OBGYN CLINIC | Facility: HOSPITAL | Age: 39
End: 2019-04-10
Payer: COMMERCIAL

## 2019-04-10 VITALS
HEART RATE: 94 BPM | DIASTOLIC BLOOD PRESSURE: 74 MMHG | SYSTOLIC BLOOD PRESSURE: 110 MMHG | WEIGHT: 163 LBS | BODY MASS INDEX: 29 KG/M2

## 2019-04-10 VITALS
HEART RATE: 82 BPM | RESPIRATION RATE: 16 BRPM | SYSTOLIC BLOOD PRESSURE: 117 MMHG | DIASTOLIC BLOOD PRESSURE: 74 MMHG | TEMPERATURE: 98 F

## 2019-04-10 DIAGNOSIS — Z34.03 ENCOUNTER FOR SUPERVISION OF NORMAL FIRST PREGNANCY IN THIRD TRIMESTER: Primary | ICD-10-CM

## 2019-04-10 PROCEDURE — 59025 FETAL NON-STRESS TEST: CPT | Performed by: ADVANCED PRACTICE MIDWIFE

## 2019-04-10 PROCEDURE — 81002 URINALYSIS NONAUTO W/O SCOPE: CPT | Performed by: ADVANCED PRACTICE MIDWIFE

## 2019-04-10 NOTE — NST
Nonstress Test   Patient: Mary Ann Zurita    Gestation: 39w0d    NST:       Variability: Moderate           Accelerations: Yes           Decelerations: None            Baseline: 125 BPM           Uterine Irritability: Yes           Contractions: Not pre

## 2019-04-10 NOTE — PROGRESS NOTES
Pt is a 45year old female admitted to TR1/TR1-A. Patient presents with:  Non-stress Test: NST for AMA     Pt is  39w0d intra-uterine pregnancy.

## 2019-04-17 ENCOUNTER — ROUTINE PRENATAL (OUTPATIENT)
Dept: OBGYN CLINIC | Facility: CLINIC | Age: 39
End: 2019-04-17
Payer: COMMERCIAL

## 2019-04-17 ENCOUNTER — APPOINTMENT (OUTPATIENT)
Dept: OBGYN CLINIC | Facility: HOSPITAL | Age: 39
End: 2019-04-17
Payer: COMMERCIAL

## 2019-04-17 ENCOUNTER — HOSPITAL ENCOUNTER (OUTPATIENT)
Facility: HOSPITAL | Age: 39
Discharge: HOME | End: 2019-04-17
Attending: ADVANCED PRACTICE MIDWIFE | Admitting: OBSTETRICS & GYNECOLOGY
Payer: COMMERCIAL

## 2019-04-17 ENCOUNTER — TELEPHONE (OUTPATIENT)
Dept: OBGYN CLINIC | Facility: CLINIC | Age: 39
End: 2019-04-17

## 2019-04-17 VITALS
SYSTOLIC BLOOD PRESSURE: 110 MMHG | BODY MASS INDEX: 30 KG/M2 | DIASTOLIC BLOOD PRESSURE: 67 MMHG | WEIGHT: 167 LBS | HEART RATE: 86 BPM

## 2019-04-17 VITALS — DIASTOLIC BLOOD PRESSURE: 72 MMHG | RESPIRATION RATE: 16 BRPM | HEART RATE: 95 BPM | SYSTOLIC BLOOD PRESSURE: 115 MMHG

## 2019-04-17 DIAGNOSIS — O48.0 POST TERM PREGNANCY, ANTEPARTUM CONDITION OR COMPLICATION: ICD-10-CM

## 2019-04-17 DIAGNOSIS — Z34.83 ENCOUNTER FOR SUPERVISION OF OTHER NORMAL PREGNANCY IN THIRD TRIMESTER: Primary | ICD-10-CM

## 2019-04-17 PROCEDURE — 81002 URINALYSIS NONAUTO W/O SCOPE: CPT | Performed by: ADVANCED PRACTICE MIDWIFE

## 2019-04-17 PROCEDURE — 59025 FETAL NON-STRESS TEST: CPT | Performed by: ADVANCED PRACTICE MIDWIFE

## 2019-04-17 NOTE — NST
Nonstress Test   Patient: Daniel Paulson    Gestation: 40w0d    NST:       Variability: Moderate           Accelerations: Yes           Decelerations: None            Baseline: 130 BPM           Uterine Irritability: Yes           Contractions: Irregul

## 2019-04-17 NOTE — PROGRESS NOTES
Feels well, endorses active fetus. Denies any regular ctx, VB, or LOF Is feeling tired of being pregnant but not ready to schedule an IOL yet. Had an NST today. Plan for NST/GERMAN and CNM visit next Tuesday.  Methods to encourage labor to start naturally disc

## 2019-04-20 ENCOUNTER — TELEPHONE (OUTPATIENT)
Dept: OBGYN CLINIC | Facility: CLINIC | Age: 39
End: 2019-04-20

## 2019-04-21 ENCOUNTER — ANESTHESIA (OUTPATIENT)
Dept: OBGYN UNIT | Facility: HOSPITAL | Age: 39
End: 2019-04-21
Payer: COMMERCIAL

## 2019-04-21 ENCOUNTER — ANESTHESIA EVENT (OUTPATIENT)
Dept: OBGYN UNIT | Facility: HOSPITAL | Age: 39
End: 2019-04-21
Payer: COMMERCIAL

## 2019-04-21 ENCOUNTER — HOSPITAL ENCOUNTER (INPATIENT)
Facility: HOSPITAL | Age: 39
LOS: 3 days | Discharge: HOME | End: 2019-04-24
Attending: ADVANCED PRACTICE MIDWIFE | Admitting: OBSTETRICS & GYNECOLOGY
Payer: COMMERCIAL

## 2019-04-21 PROCEDURE — 86901 BLOOD TYPING SEROLOGIC RH(D): CPT | Performed by: ADVANCED PRACTICE MIDWIFE

## 2019-04-21 PROCEDURE — 86850 RBC ANTIBODY SCREEN: CPT | Performed by: ADVANCED PRACTICE MIDWIFE

## 2019-04-21 PROCEDURE — 86900 BLOOD TYPING SEROLOGIC ABO: CPT | Performed by: ADVANCED PRACTICE MIDWIFE

## 2019-04-21 PROCEDURE — 85027 COMPLETE CBC AUTOMATED: CPT | Performed by: ADVANCED PRACTICE MIDWIFE

## 2019-04-21 RX ORDER — BUPIVACAINE HYDROCHLORIDE 2.5 MG/ML
INJECTION, SOLUTION EPIDURAL; INFILTRATION; INTRACAUDAL
Status: COMPLETED | OUTPATIENT
Start: 2019-04-21 | End: 2019-04-21

## 2019-04-21 RX ORDER — SODIUM CHLORIDE 0.9 % (FLUSH) 0.9 %
10 SYRINGE (ML) INJECTION AS NEEDED
Status: DISCONTINUED | OUTPATIENT
Start: 2019-04-21 | End: 2019-04-22 | Stop reason: HOSPADM

## 2019-04-21 RX ORDER — SODIUM CHLORIDE, SODIUM LACTATE, POTASSIUM CHLORIDE, CALCIUM CHLORIDE 600; 310; 30; 20 MG/100ML; MG/100ML; MG/100ML; MG/100ML
INJECTION, SOLUTION INTRAVENOUS CONTINUOUS
Status: DISCONTINUED | OUTPATIENT
Start: 2019-04-21 | End: 2019-04-21

## 2019-04-21 RX ORDER — TERBUTALINE SULFATE 1 MG/ML
0.25 INJECTION, SOLUTION SUBCUTANEOUS AS NEEDED
Status: DISCONTINUED | OUTPATIENT
Start: 2019-04-21 | End: 2019-04-22 | Stop reason: HOSPADM

## 2019-04-21 RX ORDER — LIDOCAINE HYDROCHLORIDE AND EPINEPHRINE 15; 5 MG/ML; UG/ML
INJECTION, SOLUTION EPIDURAL
Status: COMPLETED | OUTPATIENT
Start: 2019-04-21 | End: 2019-04-21

## 2019-04-21 RX ORDER — AMMONIA INHALANTS 0.04 G/.3ML
0.3 INHALANT RESPIRATORY (INHALATION) AS NEEDED
Status: DISCONTINUED | OUTPATIENT
Start: 2019-04-21 | End: 2019-04-22 | Stop reason: HOSPADM

## 2019-04-21 RX ORDER — IBUPROFEN 600 MG/1
600 TABLET ORAL ONCE AS NEEDED
Status: DISCONTINUED | OUTPATIENT
Start: 2019-04-21 | End: 2019-04-22 | Stop reason: HOSPADM

## 2019-04-21 RX ORDER — LIDOCAINE HYDROCHLORIDE 10 MG/ML
INJECTION, SOLUTION INFILTRATION; PERINEURAL
Status: COMPLETED | OUTPATIENT
Start: 2019-04-21 | End: 2019-04-21

## 2019-04-21 RX ORDER — PHENYLEPHRINE HCL IN 0.9% NACL 0.5 MG/5ML
SYRINGE (ML) INTRAVENOUS
Status: DISCONTINUED
Start: 2019-04-21 | End: 2019-04-21 | Stop reason: WASHOUT

## 2019-04-21 RX ORDER — LIDOCAINE HYDROCHLORIDE 10 MG/ML
30 INJECTION, SOLUTION EPIDURAL; INFILTRATION; INTRACAUDAL; PERINEURAL ONCE
Status: DISCONTINUED | OUTPATIENT
Start: 2019-04-21 | End: 2019-04-22 | Stop reason: HOSPADM

## 2019-04-21 RX ORDER — LIDOCAINE HYDROCHLORIDE AND EPINEPHRINE 20; 5 MG/ML; UG/ML
INJECTION, SOLUTION EPIDURAL; INFILTRATION; INTRACAUDAL; PERINEURAL
Status: DISCONTINUED
Start: 2019-04-21 | End: 2019-04-21 | Stop reason: WASHOUT

## 2019-04-21 RX ORDER — EPHEDRINE SULFATE/0.9% NACL/PF 25 MG/5 ML
SYRINGE (ML) INTRAVENOUS
Status: DISPENSED
Start: 2019-04-21 | End: 2019-04-22

## 2019-04-21 RX ORDER — BUPIVACAINE HYDROCHLORIDE 2.5 MG/ML
INJECTION, SOLUTION EPIDURAL; INFILTRATION; INTRACAUDAL
Status: DISPENSED
Start: 2019-04-21 | End: 2019-04-22

## 2019-04-21 RX ORDER — TRISODIUM CITRATE DIHYDRATE AND CITRIC ACID MONOHYDRATE 500; 334 MG/5ML; MG/5ML
30 SOLUTION ORAL AS NEEDED
Status: DISCONTINUED | OUTPATIENT
Start: 2019-04-21 | End: 2019-04-22 | Stop reason: HOSPADM

## 2019-04-21 RX ORDER — DEXTROSE, SODIUM CHLORIDE, SODIUM LACTATE, POTASSIUM CHLORIDE, AND CALCIUM CHLORIDE 5; .6; .31; .03; .02 G/100ML; G/100ML; G/100ML; G/100ML; G/100ML
INJECTION, SOLUTION INTRAVENOUS CONTINUOUS
Status: DISCONTINUED | OUTPATIENT
Start: 2019-04-21 | End: 2019-04-22

## 2019-04-21 RX ORDER — 0.9 % SODIUM CHLORIDE 0.9 %
VIAL (ML) INJECTION
Status: DISCONTINUED
Start: 2019-04-21 | End: 2019-04-21 | Stop reason: WASHOUT

## 2019-04-21 RX ADMIN — LIDOCAINE HYDROCHLORIDE 5 ML: 10 INJECTION, SOLUTION INFILTRATION; PERINEURAL at 17:31:00

## 2019-04-21 RX ADMIN — LIDOCAINE HYDROCHLORIDE AND EPINEPHRINE 5 ML: 15; 5 INJECTION, SOLUTION EPIDURAL at 17:31:00

## 2019-04-21 RX ADMIN — BUPIVACAINE HYDROCHLORIDE 5 ML: 2.5 INJECTION, SOLUTION EPIDURAL; INFILTRATION; INTRACAUDAL at 17:31:00

## 2019-04-21 NOTE — PROGRESS NOTES
[facility]      Labor Progress Note    [address] Patient Status:  Inpatient    1980 MRN [id number]   Location [address] Attending Fabi Maker, [address] Day # 0 PCP Vinay Brice MD, MD

## 2019-04-21 NOTE — PROGRESS NOTES
[facility]      Labor Progress Note    [address] Patient Status:  Inpatient    1980 MRN [id number]   Location [address] Attending Aliya Ley, [address] Day # 0 PCP Maylin Falcon MD, MD

## 2019-04-21 NOTE — ANESTHESIA PROCEDURE NOTES
Labor Analgesia  Performed by: Vibha Wallace MD  Authorized by: Vibha Wallace MD     Patient Location:  OB  Start Time:  4/21/2019 4:50 PM  End Time:  4/21/2019 5:02 PM  Site Identification: surface landmarks    Reason for Block: labor epidur

## 2019-04-21 NOTE — TELEPHONE ENCOUNTER
Patient called with report of small gush of fluid x2 the first clear and the second pink. Reports good fetal movement and slight tightening but no contractions at approx 2120.  Reviewed options and patient elects to stay at home x12 hours to see if labor s

## 2019-04-21 NOTE — ANESTHESIA PREPROCEDURE EVALUATION
Anesthesia PreOp Note    HPI:     Agusto Waterman is a 45year old female who presents for preoperative consultation requested by: * No surgeons listed *    Date of Surgery: 4/21/2019    * No procedures listed *  Indication: * No pre-op diagnosis entere Once PRN Lugenia Harada, CNM     oxyTOCIN (PITOCIN) 30 units/ 500 ml 0.9% NS premix infusion 300 mL/hr Intravenous Continuous Lugenia Harada, CNM Last Rate: 2 mL/hr at 04/21/19 1301 2 mL/hr at 04/21/19 1301   Terbutaline Sulfate (BRETHINE) 1 MG/ML inject degree (e.g., MA, MS, Nasima, MEd, MSW, JUNIOR)    Occupational History      Occupation: teacher     Social Needs      Financial resource strain: Not hard at all      Food insecurity:        Worry: Not on file        Inability: Not on file      [employer] 04/21/2019    MCHC 31.3 04/21/2019    RDW 14.7 04/21/2019    .0 04/21/2019             Vital Signs: There is no height or weight on file to calculate BMI.   temperature is 97.8 °F (36.6 °C).  Her blood pressure is 103/64 and her pulse is 138 (abnorm

## 2019-04-21 NOTE — PROGRESS NOTES
Epidural working well   renetta at bedside aware of maternal heart rate   Pt anxious encged to not hypreventilate

## 2019-04-21 NOTE — PROGRESS NOTES
[facility]      Labor Progress Note    [address] Patient Status:  Inpatient    1980 MRN [id number]   [address]  Attending Nuris Florence, [address] Day # 0 PCP Cassie Matt MD, MD

## 2019-04-21 NOTE — PROGRESS NOTES
[facility]    OB/GYNE Progress Note      [address] Patient Status:  Inpatient    1980 MRN [id number]   Location [address] [facility] Attending Tyshawn Bhakta, [address] [facility] Day # 0 PCP Eduardo Small MD, MD Negative 01/21/2019    HBVSAG Nonreactive  09/29/2018    ABO A 01/21/2019    RH Negative 01/21/2019    WBC 14.2 (H) 04/21/2019    HGB 10.7 (L) 04/21/2019    HCT 34.2 (L) 04/21/2019    .0 04/21/2019    T4F 0.65 12/21/2018    TSH 2.69 01/21/2019

## 2019-04-21 NOTE — PROGRESS NOTES
[facility]      Labor Progress Note    [address] Patient Status:  Inpatient    1980 MRN [id number]   Location [address] Attending Laura Pughtingham, [address] Day # 0 PCP Pascual Hoang MD, MD

## 2019-04-21 NOTE — PLAN OF CARE
Problem: Patient Centered Care  Goal: Patient preferences are identified and integrated in the patient's plan of care  Description  Interventions:  - What would you like us to know as we care for you?  Desires natural birth  - Provide timely, complete, an and/or relaxation techniques  - Monitor for opioid side effects  - Notify MD/LIP if interventions unsuccessful or patient reports new pain  - Anticipate increased pain with activity and pre-medicate as appropriate  Outcome: Progressing     Problem: ANXIETY

## 2019-04-21 NOTE — H&P
[facility] Patient Status:  Inpatient    1980 MRN [id number]   Location [address] [place] [address] Attending [place] Day # 0 Admitting Sarah Arias MD     Date Review of Systems:   Constitutional: denies fever, aches, chills  HEENT: denies visual changes  Skin: denies any unusual skin lesions or itching  Lungs: denies shortness of breath  Cardiovascular: denies chest pain  GI: denies abdominal pain,denies heartbu Optional Initial Labs     Test Value Reference Range Date Time    TSH 3.14 uIU/mL 0.45 - 5.33 uIU/mL 12/21/18 1651    HCV Nonreactive   Nonreactive 09/29/18 0922    Pap Smear Negative for intraepithelial lesion or malignancy  Negative for intraepith Platelets        TREP        Genital Group B Culture No Beta Hemolytic Strep Group B Isolated.    03/18/19 1846    TSH        Urine Culture        HIV Combo        GC DNA        Chlamydia DNA              Optional Labs     Test Value Reference Range Date T

## 2019-04-22 PROCEDURE — 10H073Z INSERTION OF MONITORING ELECTRODE INTO PRODUCTS OF CONCEPTION, VIA NATURAL OR ARTIFICIAL OPENING: ICD-10-PCS | Performed by: ADVANCED PRACTICE MIDWIFE

## 2019-04-22 PROCEDURE — 85025 COMPLETE CBC W/AUTO DIFF WBC: CPT | Performed by: ADVANCED PRACTICE MIDWIFE

## 2019-04-22 PROCEDURE — 88307 TISSUE EXAM BY PATHOLOGIST: CPT | Performed by: ADVANCED PRACTICE MIDWIFE

## 2019-04-22 PROCEDURE — 3E0334Z INTRODUCTION OF SERUM, TOXOID AND VACCINE INTO PERIPHERAL VEIN, PERCUTANEOUS APPROACH: ICD-10-PCS | Performed by: ADVANCED PRACTICE MIDWIFE

## 2019-04-22 PROCEDURE — 0UQMXZZ REPAIR VULVA, EXTERNAL APPROACH: ICD-10-PCS | Performed by: ADVANCED PRACTICE MIDWIFE

## 2019-04-22 PROCEDURE — 85461 HEMOGLOBIN FETAL: CPT | Performed by: ADVANCED PRACTICE MIDWIFE

## 2019-04-22 RX ORDER — IBUPROFEN 600 MG/1
600 TABLET ORAL EVERY 4 HOURS PRN
Status: DISCONTINUED | OUTPATIENT
Start: 2019-04-22 | End: 2019-04-24

## 2019-04-22 RX ORDER — IBUPROFEN 200 MG
200 TABLET ORAL EVERY 4 HOURS PRN
Status: DISCONTINUED | OUTPATIENT
Start: 2019-04-22 | End: 2019-04-24

## 2019-04-22 RX ORDER — IBUPROFEN 200 MG
400 TABLET ORAL EVERY 4 HOURS PRN
Status: DISCONTINUED | OUTPATIENT
Start: 2019-04-22 | End: 2019-04-24

## 2019-04-22 RX ORDER — CHOLECALCIFEROL (VITAMIN D3) 25 MCG
1 TABLET,CHEWABLE ORAL DAILY
Status: DISCONTINUED | OUTPATIENT
Start: 2019-04-22 | End: 2019-04-24

## 2019-04-22 RX ORDER — ONDANSETRON 2 MG/ML
4 INJECTION INTRAMUSCULAR; INTRAVENOUS EVERY 6 HOURS PRN
Status: DISCONTINUED | OUTPATIENT
Start: 2019-04-22 | End: 2019-04-24

## 2019-04-22 RX ORDER — AMMONIA INHALANTS 0.04 G/.3ML
0.3 INHALANT RESPIRATORY (INHALATION) AS NEEDED
Status: DISCONTINUED | OUTPATIENT
Start: 2019-04-22 | End: 2019-04-24

## 2019-04-22 RX ORDER — SODIUM CHLORIDE 0.9 % (FLUSH) 0.9 %
10 SYRINGE (ML) INJECTION AS NEEDED
Status: DISCONTINUED | OUTPATIENT
Start: 2019-04-22 | End: 2019-04-24

## 2019-04-22 RX ORDER — MELATONIN
325 2 TIMES DAILY WITH MEALS
Status: DISCONTINUED | OUTPATIENT
Start: 2019-04-22 | End: 2019-04-24

## 2019-04-22 RX ORDER — BISACODYL 10 MG
10 SUPPOSITORY, RECTAL RECTAL ONCE AS NEEDED
Status: DISCONTINUED | OUTPATIENT
Start: 2019-04-22 | End: 2019-04-24

## 2019-04-22 RX ORDER — SIMETHICONE 80 MG
80 TABLET,CHEWABLE ORAL 3 TIMES DAILY PRN
Status: DISCONTINUED | OUTPATIENT
Start: 2019-04-22 | End: 2019-04-24

## 2019-04-22 RX ORDER — DOCUSATE SODIUM 100 MG/1
100 CAPSULE, LIQUID FILLED ORAL 2 TIMES DAILY
Status: DISCONTINUED | OUTPATIENT
Start: 2019-04-22 | End: 2019-04-24

## 2019-04-22 RX ORDER — DIAPER,BRIEF,INFANT-TODD,DISP
1 EACH MISCELLANEOUS EVERY 6 HOURS PRN
Status: DISCONTINUED | OUTPATIENT
Start: 2019-04-22 | End: 2019-04-24

## 2019-04-22 NOTE — PROGRESS NOTES
[facility]      Labor Progress Note    [address] Patient Status:  Inpatient    1980 MRN [id number]   [address]  Attending Mica Chua, [address] Day # 0 PCP Cait Le MD, MD

## 2019-04-22 NOTE — LACTATION NOTE
[address] a lactation consultation. Patient wants to rest and wants a consultation later. Encouraged her to call her nurse or the lactation consultant when she is ready.

## 2019-04-22 NOTE — PLAN OF CARE
Problem: Patient Centered Care  Goal: Patient preferences are identified and integrated in the patient's plan of care  Description  Interventions:  - What would you like us to know as we care for you?   - Provide timely, complete, and accurate informatio Administer medication as ordered  - Teach and rehearse alternative coping skills  - Provide emotional support with 1:1 interaction with staff  Outcome: Progressing     Problem: POSTPARTUM  Goal: Long Term Goal:Experiences normal postpartum course  Descript common lactation challenges. - Recommend avoidance of specific medications or substances incompatible with breast feeding.  - Assess and monitor for signs of nipple pain/trauma. - Instruct and provide assistance with proper latch.   - Review techniques fo

## 2019-04-22 NOTE — PROGRESS NOTES
Pt received into room 359. Pt transferred via wheelchair. Report received from [facility]. Assessment and vitals WNL. Pt oriented to room, bed in lowest position, locked, siderails up x2, call light within reach. POC reviewed.

## 2019-04-22 NOTE — PROGRESS NOTES
[facility]      Labor Progress Note    [address] Patient Status:  Inpatient    1980 MRN [id number]   Location [address] Attending Danish Kinney, [address] Day # 0 PCP Ruby Willis MD, MD

## 2019-04-22 NOTE — LACTATION NOTE
LACTATION NOTE - MOTHER      Evaluation Type: Inpatient    Problems identified  Problems identified: Knowledge deficit    Maternal history  Maternal history: Anxiety    Breastfeeding goal  Breastfeeding goal: To maintain breast milk feeding per patient Bard Candelario

## 2019-04-22 NOTE — L&D DELIVERY NOTE
Brittani, [address] [place] [address] [address] [[id number]]    Labor Events     labor?:  No   steroids?:  None  Antibiotics received during labor?:  No  Antibiotics (enter # doses in comment):  none  Rupture date/time:  2019 213     Rupture type:  SROM  Fluid Living   Apgar Scoring Key:     0 1 2    Skin color Blue or pale Acrocyanotic Completely pink    Heart rate Absent <100 bpm >100 bpm    Reflex irritability No response Grimace Cry or active withdrawal    Muscle tone Limp Some flexion Active motion    Resp Yessenia Manzano present in room at time of shoulder dystocia. Thick meconium noted at delivery.  Infant with initial respiratory effort and good tone but not vigorous so cord clamped and cut and infant transferred to warmer with weak cry upon transfer, apgars 8 &

## 2019-04-22 NOTE — PROGRESS NOTES
Patient up to bathroom with assist x 2. Unable to void at this time, pt straight cathed directly after delivery . Patient transferred to mother/baby room 359 per wheelchair in stable condition with baby and personal belongings.   Accompanied by significant

## 2019-04-22 NOTE — ANESTHESIA POSTPROCEDURE EVALUATION
Patient: An Gage    Procedure Summary     Date:  04/21/19 Room / Location:      Anesthesia Start:  2426 Anesthesia Stop:  04/22/19 0108    Procedure:  LABOR ANALGESIA Diagnosis:      Scheduled Providers:   Anesthesiologist:  Joana Hernandez

## 2019-04-23 PROCEDURE — 85025 COMPLETE CBC W/AUTO DIFF WBC: CPT | Performed by: ADVANCED PRACTICE MIDWIFE

## 2019-04-23 NOTE — LACTATION NOTE
LACTATION NOTE - MOTHER      Evaluation Type: Inpatient    Problems identified  Problems identified: Knowledge deficit; Unable to acheive sustained latch    Breastfeeding goal  Breastfeeding goal: To maintain breast milk feeding per patient goal    Maternal

## 2019-04-23 NOTE — LACTATION NOTE
This note was copied from a baby's chart.   LACTATION NOTE - INFANT    Evaluation Type  Evaluation Type: Inpatient    Problems & Assessment  Problems Diagnosed or Identified: Latch difficulty  Infant Assessment: Oral mucous membranes moist;Anterior fontanel

## 2019-04-23 NOTE — PROGRESS NOTES
[place] [facility]    OB/GYNE Progress Note      [address] Patient Status:  Inpatient    1980 MRN [id number]   Location [facility] 3SE Attending Elizabeth Parry, [address] Day # 2 PCP Trisha Dee MD, MD        Assessmen 248.0 04/22/2019    T4F 0.65 12/21/2018    TSH 2.69 01/21/2019       Lab Results   Component Value Date    SPECGRAVITY 1.020 04/17/2019    GLUUR NEG 04/17/2019    NITRITE NEG 04/17/2019                   Bob Moore CNM  4/23/2019  8:27 AM

## 2019-04-23 NOTE — PLAN OF CARE
Problem: PAIN - ADULT  Goal: Verbalizes/displays adequate comfort level or patient's stated pain goal  Description  INTERVENTIONS:  - Encourage pt to monitor pain and request assistance  - Assess pain using appropriate pain scale  - Administer analgesics Progressing  Goal: Optimize infant feeding at the breast  Description  INTERVENTIONS:  - Initiate breast feeding within first hour after birth. - Monitor effectiveness of current breast feeding efforts.   - Assess support systems available to mother/famil interactions. - Assess caregiver's emotional status and coping mechanisms. - Encourage caregiver to participate in  daily care. - Assess support systems available to mother/family.  - Provide /case management support as needed.   Ole Dalton

## 2019-04-24 ENCOUNTER — TELEPHONE (OUTPATIENT)
Dept: OBGYN CLINIC | Facility: CLINIC | Age: 39
End: 2019-04-24

## 2019-04-24 VITALS
HEART RATE: 88 BPM | TEMPERATURE: 98 F | SYSTOLIC BLOOD PRESSURE: 140 MMHG | RESPIRATION RATE: 18 BRPM | DIASTOLIC BLOOD PRESSURE: 79 MMHG | OXYGEN SATURATION: 98 %

## 2019-04-24 RX ORDER — IBUPROFEN 600 MG/1
600 TABLET ORAL EVERY 6 HOURS PRN
Qty: 30 TABLET | Refills: 1 | Status: SHIPPED | OUTPATIENT
Start: 2019-04-24 | End: 2021-06-30

## 2019-04-24 NOTE — DISCHARGE SUMMARY
[place] [facility] - [facility]    Discharge Summary    [address] Patient Status:  Inpatient    1980 MRN [id number]   Location [facility] 3SE Attending Armando Mendez, [address] Day # 3       Delivering OB Clinician: Junie Castillo

## 2019-04-24 NOTE — TELEPHONE ENCOUNTER
----- Message from Brandon Avina CNM sent at 4/24/2019  7:15 AM CDT -----  Please inform patient that her placenta demonstrated mild chorioamnionitis or infection. As she has not developed any fevers this will not change her care at this time.

## 2019-04-24 NOTE — LACTATION NOTE
LACTATION NOTE - MOTHER      Evaluation Type: Inpatient    Problems identified  Problems identified: Knowledge deficit    Maternal history  Maternal history: AMA;Hypothyroid  Other/comment: Patient denies a history of anxiety per H&P note.  Does not tolerat

## 2019-04-24 NOTE — PROGRESS NOTES
[place] [facility] - [facility]    OB/GYNE Progress Note      [address] Patient Status:  Inpatient    1980 MRN [id number]   Location [facility] 3SE Attending Mary Estrada, [address] Day # 3 PCP Heather Payton MD, MD        Assessmen Lab Results   Component Value Date    TREPONEMALAB Negative 01/21/2019    HBVSAG Nonreactive  09/29/2018    ABO A 04/21/2019    RH Negative 04/21/2019    WBC 14.0 (H) 04/23/2019    HGB 8.9 (L) 04/23/2019    HCT 28.2 (L) 04/23/2019    .0 04/23/20

## 2019-04-24 NOTE — LACTATION NOTE
This note was copied from a baby's chart. LACTATION NOTE - INFANT    Evaluation Type  Evaluation Type: Inpatient    Problems & Assessment  Problems Diagnosed or Identified: Latch difficulty; Shallow latch; Excessive weight loss  Problems: comment/detail:  We

## 2019-04-24 NOTE — TELEPHONE ENCOUNTER
Left detailed message for pt notifying pt of path report & instructions per [facility] GAVINO

## 2019-04-24 NOTE — PLAN OF CARE
Problem: PAIN - ADULT  Goal: Verbalizes/displays adequate comfort level or patient's stated pain goal  Description  INTERVENTIONS:  - Encourage pt to monitor pain and request assistance  - Assess pain using appropriate pain scale  - Administer analgesics cultural beliefs/practices regarding lactation, letdown techniques, maternal food preferences.   - Assess mother's knowledge and previous experience with breast feeding.  - Provide information as needed about early infant feeding cues (e.g., rooting, lip sm

## 2019-04-25 ENCOUNTER — TELEPHONE (OUTPATIENT)
Dept: OBGYN CLINIC | Facility: CLINIC | Age: 39
End: 2019-04-25

## 2019-04-25 NOTE — TELEPHONE ENCOUNTER
Spoke with pt and advised to take Ferrous Sulfate 325 mg BID per MJ's note. Pt agreed and voiced understanding.

## 2019-04-26 ENCOUNTER — TELEPHONE (OUTPATIENT)
Dept: OBGYN CLINIC | Facility: CLINIC | Age: 39
End: 2019-04-26

## 2019-04-26 NOTE — TELEPHONE ENCOUNTER
PC to patient to f/u on how doing since left hospital. They were having some difficulties with nursing and mom and dad both tired after being up with baby. Peds had said they were both tearful/crying when she was in seeing baby on PP unit.  There was no ans

## 2019-04-27 NOTE — TELEPHONE ENCOUNTER
Spoke w/ pt. She sounded in good spirits. Has been nursing w/ nipple shield & that has helped. Her milk is in & baby has gained weight. Reports difficulty w/ latching but has appt w/ LC on Monday.  Pt states she got a 4hr chunk of sleep last night when baby

## 2019-05-03 NOTE — TELEPHONE ENCOUNTER
Returned Radha's 4480 call into Lactation Support Services at ~ 0930. She reports inconsistently using a nipple shield to latch her infant and wanting to make an outpatient appointment. Scheduled appointment for 5-6-19 at 1330.

## 2019-05-06 ENCOUNTER — NURSE ONLY (OUTPATIENT)
Dept: LACTATION | Facility: HOSPITAL | Age: 39
End: 2019-05-06
Attending: ADVANCED PRACTICE MIDWIFE
Payer: COMMERCIAL

## 2019-05-06 PROCEDURE — 99213 OFFICE O/P EST LOW 20 MIN: CPT

## 2019-05-06 NOTE — PROGRESS NOTES
LACTATION NOTE - MOTHER      Evaluation Type: Outpatient Initial    Problems identified  Problems identified: Knowledge deficit; Nipple pain(she states the nipple pain is \"every so often\" and she \"sometimes\" has noted her right nipple to be compressed a Assessment  Summary Current Feeding: Breastfeeding exclusively  Last 24 hour feeding summary: ~ 8 feedings ( ~ every 12 hours) with 2 naps/sleeping from 1300 unitl ~ 1600 and MN until 0500  Breastfeeding Assessment: Assisted with breastfeeding w/mother's p using a cradle hold. She reports hearing infant swallow during feedings and that he is \"cranky after feedings. \" Yet during this consult after feeding 20 minutes on the right using a nipple shield and then nursing ~ 15 more minutes he was satisfied and sl more easily treated. Discussed with mother goal and feeding plan: Continue to breastfeed 8-12 times per 24 hours. If family or friends assists by providing EBM, use the paced feeding method.   When providing bottle feedings, use a slow flow nipple & ho

## 2019-05-09 ENCOUNTER — POSTPARTUM (OUTPATIENT)
Dept: OBGYN CLINIC | Facility: CLINIC | Age: 39
End: 2019-05-09
Payer: COMMERCIAL

## 2019-05-09 VITALS
HEIGHT: 63 IN | HEART RATE: 78 BPM | DIASTOLIC BLOOD PRESSURE: 80 MMHG | WEIGHT: 141.38 LBS | SYSTOLIC BLOOD PRESSURE: 108 MMHG | BODY MASS INDEX: 25.05 KG/M2

## 2019-05-09 RX ORDER — MELATONIN
325
COMMUNITY
End: 2019-06-06

## 2019-05-09 NOTE — PROGRESS NOTES
HPI:   oTrsten Wallis is a 45year old  who presents for a 2 week Postpartum visit. Reports adapting well and coping well. breastfeeding successfully. No strain in relationship, partner supportive. Denies DV.   Mood is stable,  supporti as needed for Fever (severe pain). Disp: 30 tablet Rfl: 1   PRENATAL VIT-FE SULFATE-FA-DHA OR Take 1 tablet by mouth daily.  Disp:  Rfl:       Past Medical History:   Diagnosis Date   • History of chicken pox     as infant & age 6   •  (normal spontaneo deferred  BACK: mild swelling over mid-sacrum, no bruising or hematoma  ABD: fundus at sp    ASSESSMENT AND PLAN:   Normal 2 week PP  Coccycdynia   -ice to area, NSAIDs, schedule with Chiropractor for evaluation  PP anemia   -continue iron, repeat CBC at n

## 2019-05-16 PROBLEM — O09.523 MULTIGRAVIDA OF ADVANCED MATERNAL AGE IN THIRD TRIMESTER: Status: RESOLVED | Noted: 2019-02-18 | Resolved: 2019-05-16

## 2019-05-16 PROBLEM — Z36.9 FIRST TRIMESTER SCREENING: Status: RESOLVED | Noted: 2018-10-10 | Resolved: 2019-05-16

## 2019-05-16 PROBLEM — M53.3 COCCYX PAIN: Status: ACTIVE | Noted: 2019-05-16

## 2019-05-16 PROBLEM — Z36.89 ENCOUNTER FOR ULTRASOUND TO ASSESS FETAL GROWTH: Status: RESOLVED | Noted: 2019-02-18 | Resolved: 2019-05-16

## 2019-05-16 PROBLEM — O23.41 URINARY TRACT INFECTION IN MOTHER DURING FIRST TRIMESTER OF PREGNANCY: Status: RESOLVED | Noted: 2018-10-02 | Resolved: 2019-05-16

## 2019-05-16 PROBLEM — Z34.90 PREGNANCY: Status: RESOLVED | Noted: 2019-03-27 | Resolved: 2019-05-16

## 2019-06-06 ENCOUNTER — TELEPHONE (OUTPATIENT)
Dept: OBGYN CLINIC | Facility: CLINIC | Age: 39
End: 2019-06-06

## 2019-06-06 ENCOUNTER — POSTPARTUM (OUTPATIENT)
Dept: OBGYN CLINIC | Facility: CLINIC | Age: 39
End: 2019-06-06
Payer: COMMERCIAL

## 2019-06-06 VITALS — BODY MASS INDEX: 24 KG/M2 | WEIGHT: 136 LBS | DIASTOLIC BLOOD PRESSURE: 62 MMHG | SYSTOLIC BLOOD PRESSURE: 100 MMHG

## 2019-06-06 PROBLEM — O09.529 ANTEPARTUM MULTIGRAVIDA OF ADVANCED MATERNAL AGE: Status: RESOLVED | Noted: 2018-10-03 | Resolved: 2019-06-06

## 2019-06-06 PROBLEM — Z36.3 ENCOUNTER FOR ANTENATAL SCREENING FOR MALFORMATION USING ULTRASOUND: Status: RESOLVED | Noted: 2018-11-30 | Resolved: 2019-06-06

## 2019-06-06 PROBLEM — O09.511 AMA (ADVANCED MATERNAL AGE) PRIMIGRAVIDA 35+, FIRST TRIMESTER: Status: RESOLVED | Noted: 2018-10-10 | Resolved: 2019-06-06

## 2019-06-06 RX ORDER — METHYLERGONOVINE MALEATE 0.2 MG/1
0.2 TABLET ORAL 4 TIMES DAILY
Qty: 8 TABLET | Refills: 0 | Status: SHIPPED | OUTPATIENT
Start: 2019-06-06 | End: 2019-06-08

## 2019-06-06 NOTE — PROGRESS NOTES
HPI:   Jason Maldonado is a 45year old  who presents for a 6 week Postpartum visit. Reports adapting well and coping well. Breastfeeding successfully. No strain in relationship, partner supportive. Denies DV. Mood is good.   Reports still hav OR Take 1 tablet by mouth daily.  Disp:  Rfl:       Past Medical History:   Diagnosis Date   • History of chicken pox     as infant & age 6   •  (normal spontaneous vaginal delivery) 2019   • Postpartum anemia 2019   • Seasonal allergies noted    Vagina: tone fair, no bleeding or discharge    Cervix: LTC, Neg CMT    Uterus: Midline, 6 week size non-tender,   mobile    Adnexae: normal, non-tender bilaterally    Rectum:  No hemorrhoids or lesions noted  EXTREMITIES: no edema, NEG Homans    A

## 2019-06-07 ENCOUNTER — TELEPHONE (OUTPATIENT)
Dept: OBGYN CLINIC | Facility: CLINIC | Age: 39
End: 2019-06-07

## 2019-06-07 NOTE — TELEPHONE ENCOUNTER
Pt states she is breast feeding but not feeling well requesting to speak with nurse to find out if ok to take Lucio Drummond

## 2019-06-07 NOTE — TELEPHONE ENCOUNTER
Spoke with pt who states she has a cold and would like to know if she can take Tresia Davis while breastfeeding. Advised per BR Tresia Davis should not be taken while breastfeeding.  Advised she can use saline nasal spray, salt water gargles, and take Ibupr

## 2019-06-15 ENCOUNTER — TELEPHONE (OUTPATIENT)
Dept: OBGYN CLINIC | Facility: CLINIC | Age: 39
End: 2019-06-15

## 2019-07-15 ENCOUNTER — TELEPHONE (OUTPATIENT)
Dept: OBGYN CLINIC | Facility: CLINIC | Age: 39
End: 2019-07-15

## 2019-07-15 NOTE — TELEPHONE ENCOUNTER
Reminded pt to complete cbc. Pt states she has no symptoms of low iron & feels fine. Pt requests lab to be canceled.  Pt verbalized an understanding & agrees w/ plan

## 2019-07-18 ENCOUNTER — OFFICE VISIT (OUTPATIENT)
Dept: OBGYN CLINIC | Facility: CLINIC | Age: 39
End: 2019-07-18
Payer: COMMERCIAL

## 2019-07-18 ENCOUNTER — NURSE ONLY (OUTPATIENT)
Dept: LACTATION | Facility: HOSPITAL | Age: 39
End: 2019-07-18
Attending: ADVANCED PRACTICE MIDWIFE
Payer: COMMERCIAL

## 2019-07-18 VITALS
SYSTOLIC BLOOD PRESSURE: 118 MMHG | DIASTOLIC BLOOD PRESSURE: 82 MMHG | HEART RATE: 130 BPM | BODY MASS INDEX: 22 KG/M2 | WEIGHT: 125 LBS

## 2019-07-18 DIAGNOSIS — S20.129A POSTPARTUM MILK BLEB: Primary | ICD-10-CM

## 2019-07-18 DIAGNOSIS — O92.29 POSTPARTUM MILK BLEB: Primary | ICD-10-CM

## 2019-07-18 DIAGNOSIS — O92.79 DISORDER OF LACTATION, POSTPARTUM CONDITION OR COMPLICATION: Primary | ICD-10-CM

## 2019-07-18 PROCEDURE — 99213 OFFICE O/P EST LOW 20 MIN: CPT | Performed by: ADVANCED PRACTICE MIDWIFE

## 2019-07-18 PROCEDURE — 99211 OFF/OP EST MAY X REQ PHY/QHP: CPT

## 2019-07-18 RX ORDER — BREAST PUMP
EACH MISCELLANEOUS
Refills: 0 | Status: CANCELLED | OUTPATIENT
Start: 2019-07-18

## 2019-07-18 NOTE — PROGRESS NOTES
Mom has had R breast pain for about 2 weeks with a nipple bleb on the R nipple. The L nipple also noted to have a nipple bleb. Mom has tried to relieve it with warm compresses and use of nipple cream or oil.   Showed her how to try to express it with her

## 2019-07-19 NOTE — PROGRESS NOTES
HPI:   Pt presents as walk-in today at 3 months PP for evaluation of nipple bleb. Pt reports has had a \"white bump\" at tip of right nipple for couple of weeks. Has tried warm water soaks, massage, nursing it out, but it has not resolved.   Is getting t SYSTEMS:   GENERAL: feels well, no fever aches or chills  NEURO: denies headaches, dizziness or weakness  SKIN: denies any unusual skin lesions  BREAST:  Denies masses or nipple discharge, denies breast redness or generalized tenderness  : denies dysuria

## 2019-07-19 NOTE — PATIENT INSTRUCTIONS
Nipple Bleb - From Damián Preston MD website http://breastfeedingmadesimple. [employer]/wp-content/uploads/2016/02/bmsnippleblebs. pdf    If it hurts, try the following:  • Before breastfeeding, apply wet heat by either soaking the nipple in the bath or the  sink or

## 2019-07-25 ENCOUNTER — TELEPHONE (OUTPATIENT)
Dept: OBGYN CLINIC | Facility: CLINIC | Age: 39
End: 2019-07-25

## 2019-07-25 NOTE — TELEPHONE ENCOUNTER
Pt has another milk bleb, this time on her left nipple. Isn't sure if she needs an appt. Pt has been using warm compress & massage but bleb not decreasing in size. Is not currently causing pt any pain. Pt afraid to needle bleb herself.  Advised pt to keep a

## 2019-07-26 ENCOUNTER — OFFICE VISIT (OUTPATIENT)
Dept: OBGYN CLINIC | Facility: CLINIC | Age: 39
End: 2019-07-26
Payer: COMMERCIAL

## 2019-07-26 VITALS
WEIGHT: 124 LBS | BODY MASS INDEX: 21.97 KG/M2 | SYSTOLIC BLOOD PRESSURE: 119 MMHG | HEART RATE: 118 BPM | DIASTOLIC BLOOD PRESSURE: 76 MMHG | HEIGHT: 63 IN

## 2019-07-26 DIAGNOSIS — S20.129A POSTPARTUM MILK BLEB: Primary | ICD-10-CM

## 2019-07-26 DIAGNOSIS — O92.29 POSTPARTUM MILK BLEB: Primary | ICD-10-CM

## 2019-07-26 PROCEDURE — 99213 OFFICE O/P EST LOW 20 MIN: CPT | Performed by: ADVANCED PRACTICE MIDWIFE

## 2019-07-26 NOTE — PROGRESS NOTES
Baldo Osborn is a 45year old female. HPI:   Patient presents with:  Gyn Exam: Left breast milk blister check      Seen last week by RB and had milk bleb opened up on right nipple.  Reports was able to extract bleb out at visit and has been using A pain, dyspareunia, sexual dysfunction, breast mass and hot flashes. PHYSICAL EXAM:      07/26/19  1121   BP: 119/76   Pulse: 118     Physical Exam   Vitals reviewed. Constitutional: She appears well-developed and well-nourished.    Pulmonary/Chest:

## 2020-02-04 ENCOUNTER — HOSPITAL ENCOUNTER (OUTPATIENT)
Age: 40
Discharge: HOME | End: 2020-02-04
Attending: EMERGENCY MEDICINE
Payer: COMMERCIAL

## 2020-02-04 VITALS
OXYGEN SATURATION: 100 % | DIASTOLIC BLOOD PRESSURE: 71 MMHG | RESPIRATION RATE: 18 BRPM | HEART RATE: 110 BPM | SYSTOLIC BLOOD PRESSURE: 123 MMHG | TEMPERATURE: 100 F

## 2020-02-04 DIAGNOSIS — N61.0 ACUTE MASTITIS: Primary | ICD-10-CM

## 2020-02-04 LAB
POCT INFLUENZA A: NEGATIVE
POCT INFLUENZA B: NEGATIVE

## 2020-02-04 PROCEDURE — 99213 OFFICE O/P EST LOW 20 MIN: CPT

## 2020-02-04 PROCEDURE — 87502 INFLUENZA DNA AMP PROBE: CPT | Performed by: EMERGENCY MEDICINE

## 2020-02-04 PROCEDURE — 99204 OFFICE O/P NEW MOD 45 MIN: CPT

## 2020-02-04 RX ORDER — CEPHALEXIN 500 MG/1
500 CAPSULE ORAL 3 TIMES DAILY
Qty: 30 CAPSULE | Refills: 0 | Status: SHIPPED | OUTPATIENT
Start: 2020-02-04 | End: 2020-02-14

## 2020-02-05 NOTE — ED PROVIDER NOTES
Patient Seen in: [address]      History   Patient presents with:  Cough/URI    Stated Complaint: FLU LIKE SYMPTOMS    HPI    Patient is a 66-year-old female without significant past medical history, currently breast-fee subjective fever during a significant flu season and is in close contact with and infant.   Rapid flu was checked and rapid flu was negative       Findings consistent with acute mastitis        MDM     We will treat with Keflex and have the patient follow-u

## 2020-02-05 NOTE — ED INITIAL ASSESSMENT (HPI)
Pt here with complaints of fatigue and chills that started yesterday, pt currently has a fever , pt also states she is breastfeeding and has been having pain to her left breast , pt denies any redness or swelling and is still pumping

## 2020-02-08 ENCOUNTER — OFFICE VISIT (OUTPATIENT)
Dept: OBGYN CLINIC | Facility: CLINIC | Age: 40
End: 2020-02-08
Payer: COMMERCIAL

## 2020-02-08 VITALS
HEART RATE: 80 BPM | WEIGHT: 124 LBS | DIASTOLIC BLOOD PRESSURE: 76 MMHG | TEMPERATURE: 98 F | BODY MASS INDEX: 21.97 KG/M2 | HEIGHT: 63 IN | SYSTOLIC BLOOD PRESSURE: 109 MMHG

## 2020-02-08 DIAGNOSIS — R52 GENERALIZED BODY ACHES: Primary | ICD-10-CM

## 2020-02-08 PROCEDURE — 99213 OFFICE O/P EST LOW 20 MIN: CPT | Performed by: ADVANCED PRACTICE MIDWIFE

## 2020-02-09 NOTE — PROGRESS NOTES
Prieto Gregory is a 44year old female. HPI:   Patient presents with:  Gyn Exam: Patient reports body aches since 02/04/20 2-3/10       Here for f/u from Urgent care visit on 02/04 where dx with mastitis and started on Keflex.  Had low grade fevers 1 Runny nose      ROS:   Review of Systems   Constitutional: Positive for fatigue. Negative for activity change, appetite change, chills, diaphoresis, fever and unexpected weight change.         Body aches     HENT: Negative for congestion, ear pain, rhinorrh

## 2021-06-28 ENCOUNTER — TELEPHONE (OUTPATIENT)
Dept: OBGYN CLINIC | Facility: CLINIC | Age: 41
End: 2021-06-28

## 2021-06-28 NOTE — TELEPHONE ENCOUNTER
Patient has been trying to conceive and wanted to speak to a midwife. Advise she can discuss further with the midwife she is seeing on Wed.  Patient agrees with plan patient verbally understands

## 2021-06-30 ENCOUNTER — OFFICE VISIT (OUTPATIENT)
Dept: OBGYN CLINIC | Facility: CLINIC | Age: 41
End: 2021-06-30
Payer: COMMERCIAL

## 2021-06-30 VITALS
WEIGHT: 134 LBS | SYSTOLIC BLOOD PRESSURE: 130 MMHG | DIASTOLIC BLOOD PRESSURE: 84 MMHG | HEIGHT: 62 IN | BODY MASS INDEX: 24.66 KG/M2 | HEART RATE: 94 BPM

## 2021-06-30 DIAGNOSIS — Z31.9 PATIENT DESIRES PREGNANCY: Primary | ICD-10-CM

## 2021-06-30 PROCEDURE — 3079F DIAST BP 80-89 MM HG: CPT | Performed by: ADVANCED PRACTICE MIDWIFE

## 2021-06-30 PROCEDURE — 3075F SYST BP GE 130 - 139MM HG: CPT | Performed by: ADVANCED PRACTICE MIDWIFE

## 2021-06-30 PROCEDURE — 99402 PREV MED CNSL INDIV APPRX 30: CPT | Performed by: ADVANCED PRACTICE MIDWIFE

## 2021-06-30 PROCEDURE — 3008F BODY MASS INDEX DOCD: CPT | Performed by: ADVANCED PRACTICE MIDWIFE

## 2021-06-30 NOTE — PROGRESS NOTES
Patient presents with:  Consult: FERTILITY. Has been trying to conceive x 1 year. Cycles at 26-28 days. HPI:   Bimal Harp is 36year old , here today to discuss options related to improving chances of conceiving.  States that she stopped breastfe nose    ROS:  Review of Systems   Constitutional: Negative. Genitourinary: Negative. PHYSICAL EXAM:   06/30/21  1512   BP: 130/84   Pulse: 94     Physical Exam  Vitals reviewed. Constitutional:       Appearance: Normal appearance.    HENT: and Estradiol. Day#21 Progesterone  Discussed testing for ovarian reserve with anti-müllerian hormone. Discussed testing for ovulatory issues with TSH and at times prolactin.     25 minutes face to face counseling, chart review, orders and coordination of

## 2021-06-30 NOTE — PATIENT INSTRUCTIONS
Understanding Fertility Problems: Keeping a Positive Attitude     Talking with your partner can help you both feel better. Dealing with fertility problems can be exhausting. But if you feel discouraged or depressed, remember that you’re not alone.  Keep Fertility problems can strain even the best relationships. That’s why supporting each other now is more important than ever. Be careful not to assign blame or lash out in anger. Instead, listen to each other. Share your feelings.  And make time for intimacy for both partners to have factors that lower fertility. And sometimes the cause is unknown. So try not to feel guilty or place blame if you are having trouble getting pregnant. Instead, share the challenge and support each other.   Older age  Age is a major provider look for the cause of fertility issues, you will have an evaluation. It will include your health history, a physical exam, and some basic tests. If needed, your provider may also suggest procedures.  These allow him or her to look at your reproduct view the shape of the uterus and make sure the fallopian tubes are open. During the test, the healthcare provider puts contrast fluid in the uterus and fallopian tubes. The contrast makes it easier to see problems on the X-rays. · Ultrasound.  This uses pa include:  · A lack of cervical mucus. This can slow or block the passage of sperm. This can be treated with assisted reproductive techniques. · An infection. This can be treated with antibiotics.   Treating the fallopian tubes  There may be a problem in th simple methods to help you get pregnant. Most focus on predicting ovulation. This is the time when sex has the best chance of success. Your healthcare provider may also advise working on other factors that can affect fertility.   Predicting ovulation  Jyoti hormone levels in men and women. They can also affect the quantity and quality of sperm. Be sure to tell your healthcare provider about any substances you take.   Sexually transmitted diseases  Sexually transmitted diseases (STDs) can scar the reproductive

## 2021-08-23 ENCOUNTER — TELEPHONE (OUTPATIENT)
Dept: OBGYN CLINIC | Facility: CLINIC | Age: 41
End: 2021-08-23

## 2021-08-23 DIAGNOSIS — N97.9 FEMALE INFERTILITY: Primary | ICD-10-CM

## 2021-08-25 ENCOUNTER — OFFICE VISIT (OUTPATIENT)
Dept: OBGYN CLINIC | Facility: CLINIC | Age: 41
End: 2021-08-25
Payer: COMMERCIAL

## 2021-08-25 VITALS
SYSTOLIC BLOOD PRESSURE: 117 MMHG | DIASTOLIC BLOOD PRESSURE: 74 MMHG | BODY MASS INDEX: 22.95 KG/M2 | HEART RATE: 64 BPM | WEIGHT: 129.5 LBS | HEIGHT: 63 IN

## 2021-08-25 DIAGNOSIS — Z31.9 PATIENT DESIRES PREGNANCY: Primary | ICD-10-CM

## 2021-08-25 PROCEDURE — 3078F DIAST BP <80 MM HG: CPT | Performed by: ADVANCED PRACTICE MIDWIFE

## 2021-08-25 PROCEDURE — 3008F BODY MASS INDEX DOCD: CPT | Performed by: ADVANCED PRACTICE MIDWIFE

## 2021-08-25 PROCEDURE — 3074F SYST BP LT 130 MM HG: CPT | Performed by: ADVANCED PRACTICE MIDWIFE

## 2021-08-25 NOTE — PROGRESS NOTES
Pt had labs ordered per prior visit but when she inquired with the nurse she was told she needed to be seen so scheduled this appointment. Pt plans to complete labs. No new issues addressed today. No charge.

## 2021-09-20 ENCOUNTER — LAB ENCOUNTER (OUTPATIENT)
Dept: LAB | Facility: HOSPITAL | Age: 41
End: 2021-09-20
Attending: ADVANCED PRACTICE MIDWIFE
Payer: COMMERCIAL

## 2021-09-20 DIAGNOSIS — N97.9 FEMALE INFERTILITY: ICD-10-CM

## 2021-09-20 LAB
ESTRADIOL SERPL-MCNC: 68.5 PG/ML
FSH SERPL-ACNC: 6.1 MIU/ML
PROGEST SERPL-MCNC: 0.94 NG/ML
TSI SER-ACNC: 4.77 MIU/ML (ref 0.36–3.74)

## 2021-09-20 PROCEDURE — 36415 COLL VENOUS BLD VENIPUNCTURE: CPT

## 2021-09-20 PROCEDURE — 84144 ASSAY OF PROGESTERONE: CPT

## 2021-09-20 PROCEDURE — 83001 ASSAY OF GONADOTROPIN (FSH): CPT

## 2021-09-20 PROCEDURE — 84443 ASSAY THYROID STIM HORMONE: CPT

## 2021-09-20 PROCEDURE — 82670 ASSAY OF TOTAL ESTRADIOL: CPT

## 2021-09-20 PROCEDURE — 83520 IMMUNOASSAY QUANT NOS NONAB: CPT

## 2021-09-23 LAB — ANTI-MULLERIAN HORMONE: 1.68 NG/ML

## 2021-10-08 ENCOUNTER — TELEPHONE (OUTPATIENT)
Dept: OBGYN CLINIC | Facility: CLINIC | Age: 41
End: 2021-10-08

## 2021-10-08 DIAGNOSIS — N97.9 FEMALE INFERTILITY: Primary | ICD-10-CM

## 2021-10-13 ENCOUNTER — TELEPHONE (OUTPATIENT)
Dept: OBGYN CLINIC | Facility: CLINIC | Age: 41
End: 2021-10-13

## 2021-10-13 NOTE — TELEPHONE ENCOUNTER
Attempted to reach patient by phone. No answer. Left VM. Pt was supposed to see PCP for treatment of abnormal TSH lab. I did not know that she was going back to the lab for the Day 21 progesterone.  I remain with my recommendation to treat the thyroid t

## 2021-10-15 NOTE — TELEPHONE ENCOUNTER
Pt called back, see other thread. Notified pt of TM message. Pt states she never knew anything about the abnormal  thyroid levels or any results from her day 3 labs.  Upon further investigating, it was noted that TM sent pt a MyChart message regarding re

## 2021-10-21 ENCOUNTER — TELEPHONE (OUTPATIENT)
Dept: OBGYN CLINIC | Facility: CLINIC | Age: 41
End: 2021-10-21

## 2021-10-21 NOTE — TELEPHONE ENCOUNTER
TM req orders for fertility testing placed for FSH, estradiol, day 3 & day 21 progesterone. Orders entered.  TM to call pt

## 2021-10-21 NOTE — TELEPHONE ENCOUNTER
Attempted to reach patient by phone to follow-up once again regarding abnormal labs and no progesterone 21-day order. No answer. Left VM.

## 2021-11-12 ENCOUNTER — TELEPHONE (OUTPATIENT)
Dept: OBGYN CLINIC | Facility: CLINIC | Age: 41
End: 2021-11-12

## 2021-11-12 NOTE — TELEPHONE ENCOUNTER
Pt states she decided to have her thyroid managed w/ medication now. advised pt she should contact her pcp for management. Pt states that during her pregnancy, she saw someone from [facility] but does not know the name.  Advised pt she saw Dr Addis Nguyen

## 2021-12-21 ENCOUNTER — OFFICE VISIT (OUTPATIENT)
Dept: OBGYN CLINIC | Facility: CLINIC | Age: 41
End: 2021-12-21
Payer: COMMERCIAL

## 2021-12-21 VITALS
BODY MASS INDEX: 23 KG/M2 | HEIGHT: 63 IN | HEART RATE: 101 BPM | DIASTOLIC BLOOD PRESSURE: 79 MMHG | SYSTOLIC BLOOD PRESSURE: 122 MMHG

## 2021-12-21 DIAGNOSIS — N92.6 MISSED MENSES: Primary | ICD-10-CM

## 2021-12-21 PROCEDURE — 99214 OFFICE O/P EST MOD 30 MIN: CPT | Performed by: ADVANCED PRACTICE MIDWIFE

## 2021-12-21 PROCEDURE — 3074F SYST BP LT 130 MM HG: CPT | Performed by: ADVANCED PRACTICE MIDWIFE

## 2021-12-21 PROCEDURE — 81025 URINE PREGNANCY TEST: CPT | Performed by: ADVANCED PRACTICE MIDWIFE

## 2021-12-21 PROCEDURE — 3078F DIAST BP <80 MM HG: CPT | Performed by: ADVANCED PRACTICE MIDWIFE

## 2021-12-23 NOTE — PROGRESS NOTES
Pt is a  history of a vaginal birth with a shoulder dystocia. Pt reports that she is sure of her LMP Denies any bleeding, spottiong or pain since LMP  Taking a PNV  1.   Discussed importance of folic acid, calcium, vitamin D.   2.  Reviewed pregnancy r

## 2022-01-05 ENCOUNTER — NURSE ONLY (OUTPATIENT)
Dept: OBGYN CLINIC | Facility: CLINIC | Age: 42
End: 2022-01-05
Payer: COMMERCIAL

## 2022-01-05 DIAGNOSIS — O09.521 AMA (ADVANCED MATERNAL AGE) MULTIGRAVIDA 35+, FIRST TRIMESTER: Primary | ICD-10-CM

## 2022-01-05 NOTE — PROGRESS NOTES
Phone OB RN education visit, educational material reviewed. Pt verbalized understanding. Orders placed for NOB labs including HCV, hemoglobin A1C and TSH. Patient desires FTS. MFM order placed, number given to patient to call and schedule.   Pt had a Pap 1

## 2022-01-27 ENCOUNTER — ROUTINE PRENATAL (OUTPATIENT)
Dept: OBGYN CLINIC | Facility: CLINIC | Age: 42
End: 2022-01-27
Payer: COMMERCIAL

## 2022-01-27 ENCOUNTER — HOSPITAL ENCOUNTER (OUTPATIENT)
Dept: ULTRASOUND IMAGING | Facility: HOSPITAL | Age: 42
Discharge: HOME | End: 2022-01-27
Attending: ADVANCED PRACTICE MIDWIFE
Payer: COMMERCIAL

## 2022-01-27 ENCOUNTER — LAB ENCOUNTER (OUTPATIENT)
Dept: LAB | Facility: HOSPITAL | Age: 42
End: 2022-01-27
Attending: ADVANCED PRACTICE MIDWIFE
Payer: COMMERCIAL

## 2022-01-27 ENCOUNTER — TELEPHONE (OUTPATIENT)
Dept: OBGYN CLINIC | Facility: CLINIC | Age: 42
End: 2022-01-27

## 2022-01-27 VITALS — SYSTOLIC BLOOD PRESSURE: 109 MMHG | DIASTOLIC BLOOD PRESSURE: 75 MMHG | HEART RATE: 85 BPM

## 2022-01-27 DIAGNOSIS — O26.859 SPOTTING IN EARLY PREGNANCY: ICD-10-CM

## 2022-01-27 DIAGNOSIS — O26.859 SPOTTING IN EARLY PREGNANCY: Primary | ICD-10-CM

## 2022-01-27 LAB
ANTIBODY SCREEN: NEGATIVE
B-HCG SERPL-ACNC: 9132 MIU/ML
RH BLOOD TYPE: NEGATIVE

## 2022-01-27 PROCEDURE — 76817 TRANSVAGINAL US OBSTETRIC: CPT | Performed by: ADVANCED PRACTICE MIDWIFE

## 2022-01-27 PROCEDURE — 3074F SYST BP LT 130 MM HG: CPT | Performed by: ADVANCED PRACTICE MIDWIFE

## 2022-01-27 PROCEDURE — 86850 RBC ANTIBODY SCREEN: CPT

## 2022-01-27 PROCEDURE — 76801 OB US < 14 WKS SINGLE FETUS: CPT | Performed by: ADVANCED PRACTICE MIDWIFE

## 2022-01-27 PROCEDURE — 3078F DIAST BP <80 MM HG: CPT | Performed by: ADVANCED PRACTICE MIDWIFE

## 2022-01-27 PROCEDURE — 36415 COLL VENOUS BLD VENIPUNCTURE: CPT

## 2022-01-27 PROCEDURE — 84702 CHORIONIC GONADOTROPIN TEST: CPT

## 2022-01-27 PROCEDURE — 86900 BLOOD TYPING SEROLOGIC ABO: CPT

## 2022-01-27 PROCEDURE — 86901 BLOOD TYPING SEROLOGIC RH(D): CPT

## 2022-01-27 NOTE — PROGRESS NOTES
Brownish discharge when wiping one time today. Denies cramping or pelvic pain. Small blood in vaginal vault. Cervix closed. Uterus enlarged. Unable to hear FHTs by doppler. Hold and call ultrasound scheduled for 7pm. Patient is Rh negative.  Blood type and

## 2022-01-27 NOTE — TELEPHONE ENCOUNTER
Reports dark red spotting w/ a clot. Denies any cramping. Offered appt & pt accepted & scheduled.  Pt verbalized an understanding & agrees w/ plan

## 2022-01-28 ENCOUNTER — TELEPHONE (OUTPATIENT)
Dept: OBGYN CLINIC | Facility: CLINIC | Age: 42
End: 2022-01-28

## 2022-01-28 ENCOUNTER — NURSE ONLY (OUTPATIENT)
Dept: OBGYN CLINIC | Facility: CLINIC | Age: 42
End: 2022-01-28
Payer: COMMERCIAL

## 2022-01-28 DIAGNOSIS — O20.9 BLEEDING IN EARLY PREGNANCY: Primary | ICD-10-CM

## 2022-01-28 DIAGNOSIS — O26.899 RH NEGATIVE, ANTEPARTUM: ICD-10-CM

## 2022-01-28 DIAGNOSIS — Z67.91 RH NEGATIVE, ANTEPARTUM: ICD-10-CM

## 2022-01-28 PROCEDURE — 96372 THER/PROPH/DIAG INJ SC/IM: CPT | Performed by: ADVANCED PRACTICE MIDWIFE

## 2022-01-28 NOTE — TELEPHONE ENCOUNTER
Pt. States that she is pregnant, and is concerned about having vaginal bleeding like a period. Pt. Has questions for the nurse and she is not sure if she should still get blood work up done tomorrow.

## 2022-01-28 NOTE — TELEPHONE ENCOUNTER
----- Message from Miguel Worthington CNM sent at 1/28/2022 10:15 AM CST -----  Please call patient. She should come in for rhogam today. Due to spotting. We discussed this yesterday but I don't see that she has an appointment. Thanks!

## 2022-01-28 NOTE — PROGRESS NOTES
Prenatal patient in today for Rhogam injection for vag bleeding in early pregnancy. Patient is 11w0d today. No Ultrasound needed at this time. Bleeding period-like. Injection given left ventrogluteal. Patient tolerated well.  Rhogam information sheet prov

## 2022-01-28 NOTE — TELEPHONE ENCOUNTER
Reports bleeding is like a period & passed a small clot. Pt states that per MBW the ultrasound yesterday showed two 5wk embryos. Feeling abd tightness, not much cramping. advised she needs to receive Rhogam. Pt is teaching.  Discussed appropriate time to co

## 2022-01-29 ENCOUNTER — TELEPHONE (OUTPATIENT)
Dept: OBGYN CLINIC | Facility: CLINIC | Age: 42
End: 2022-01-29

## 2022-01-29 ENCOUNTER — LAB ENCOUNTER (OUTPATIENT)
Dept: LAB | Facility: HOSPITAL | Age: 42
End: 2022-01-29
Attending: ADVANCED PRACTICE MIDWIFE
Payer: COMMERCIAL

## 2022-01-29 ENCOUNTER — MOBILE ENCOUNTER (OUTPATIENT)
Dept: OBGYN CLINIC | Facility: CLINIC | Age: 42
End: 2022-01-29

## 2022-01-29 DIAGNOSIS — O20.9 BLEEDING IN EARLY PREGNANCY: ICD-10-CM

## 2022-01-29 LAB — B-HCG SERPL-ACNC: 6836 MIU/ML

## 2022-01-29 PROCEDURE — 36415 COLL VENOUS BLD VENIPUNCTURE: CPT

## 2022-01-29 PROCEDURE — 84702 CHORIONIC GONADOTROPIN TEST: CPT

## 2022-01-29 NOTE — TELEPHONE ENCOUNTER
Attempted to reach pt by phone regarding today;s [facility] results. No answer. Left VM.

## 2022-01-30 ENCOUNTER — TELEPHONE (OUTPATIENT)
Dept: OBGYN CLINIC | Facility: CLINIC | Age: 42
End: 2022-01-30

## 2022-01-30 DIAGNOSIS — O20.0 THREATENED ABORTION, ANTEPARTUM: Primary | ICD-10-CM

## 2022-01-30 LAB
GENITAL VAGINOSIS SCREEN: NEGATIVE
TRICHOMONAS SCREEN: NEGATIVE

## 2022-01-30 NOTE — TELEPHONE ENCOUNTER
Pt notified by phone of lower bHCG level and likely SAB in progress. She remains with bleeding. Had Rhogam on Friday. Voices understanding and agreed to repeat bHCG in one week.

## 2022-02-07 ENCOUNTER — LAB ENCOUNTER (OUTPATIENT)
Dept: LAB | Facility: HOSPITAL | Age: 42
End: 2022-02-07
Attending: ADVANCED PRACTICE MIDWIFE
Payer: COMMERCIAL

## 2022-02-07 DIAGNOSIS — O20.0 THREATENED ABORTION, ANTEPARTUM: ICD-10-CM

## 2022-02-07 LAB — B-HCG SERPL-ACNC: 1970 MIU/ML

## 2022-02-07 PROCEDURE — 36415 COLL VENOUS BLD VENIPUNCTURE: CPT

## 2022-02-07 PROCEDURE — 84702 CHORIONIC GONADOTROPIN TEST: CPT

## 2022-02-14 ENCOUNTER — TELEPHONE (OUTPATIENT)
Dept: OBGYN CLINIC | Facility: CLINIC | Age: 42
End: 2022-02-14

## 2022-02-14 ENCOUNTER — LAB ENCOUNTER (OUTPATIENT)
Dept: LAB | Facility: HOSPITAL | Age: 42
End: 2022-02-14
Attending: ADVANCED PRACTICE MIDWIFE
Payer: COMMERCIAL

## 2022-02-14 DIAGNOSIS — O20.0 THREATENED ABORTION, ANTEPARTUM: ICD-10-CM

## 2022-02-14 LAB — B-HCG SERPL-ACNC: 987 MIU/ML

## 2022-02-14 PROCEDURE — 36415 COLL VENOUS BLD VENIPUNCTURE: CPT

## 2022-02-14 PROCEDURE — 84702 CHORIONIC GONADOTROPIN TEST: CPT

## 2022-02-14 NOTE — TELEPHONE ENCOUNTER
Spoke with pt and advised she should continue to have bhcg drawn until level returns to 0. Standing order in pt's chart. Pt agreed and voiced understanding.

## 2022-02-24 ENCOUNTER — LAB ENCOUNTER (OUTPATIENT)
Dept: LAB | Facility: HOSPITAL | Age: 42
End: 2022-02-24
Attending: ADVANCED PRACTICE MIDWIFE
Payer: COMMERCIAL

## 2022-02-24 DIAGNOSIS — O03.4 INCOMPLETE SPONTANEOUS ABORTION: ICD-10-CM

## 2022-02-24 LAB — B-HCG SERPL-ACNC: 386 MIU/ML

## 2022-02-24 PROCEDURE — 36415 COLL VENOUS BLD VENIPUNCTURE: CPT

## 2022-02-24 PROCEDURE — 84702 CHORIONIC GONADOTROPIN TEST: CPT

## 2022-03-04 ENCOUNTER — LAB ENCOUNTER (OUTPATIENT)
Dept: LAB | Facility: HOSPITAL | Age: 42
End: 2022-03-04
Attending: ADVANCED PRACTICE MIDWIFE
Payer: COMMERCIAL

## 2022-03-04 ENCOUNTER — TELEPHONE (OUTPATIENT)
Dept: OBGYN CLINIC | Facility: CLINIC | Age: 42
End: 2022-03-04

## 2022-03-04 DIAGNOSIS — O20.0 THREATENED ABORTION, ANTEPARTUM: ICD-10-CM

## 2022-03-04 LAB — B-HCG SERPL-ACNC: 247 MIU/ML

## 2022-03-04 PROCEDURE — 36415 COLL VENOUS BLD VENIPUNCTURE: CPT

## 2022-03-04 PROCEDURE — 84702 CHORIONIC GONADOTROPIN TEST: CPT

## 2022-03-04 NOTE — TELEPHONE ENCOUNTER
Spoke with pt who states she did not have a question regarding bhcg lab orders and does not why the phone room sent the message.

## 2022-03-04 NOTE — TELEPHONE ENCOUNTER
Pt asking if she still needs to get HCG testing?   She believes she was to do 5 tests starting 2/5    It looks like there are still 2 orders  Please advise

## 2022-03-08 ENCOUNTER — TELEPHONE (OUTPATIENT)
Dept: OBGYN UNIT | Facility: HOSPITAL | Age: 42
End: 2022-03-08

## 2022-03-11 ENCOUNTER — TELEPHONE (OUTPATIENT)
Dept: OBGYN CLINIC | Facility: CLINIC | Age: 42
End: 2022-03-11

## 2022-03-11 ENCOUNTER — LAB ENCOUNTER (OUTPATIENT)
Dept: LAB | Facility: HOSPITAL | Age: 42
End: 2022-03-11
Attending: ADVANCED PRACTICE MIDWIFE
Payer: COMMERCIAL

## 2022-03-11 DIAGNOSIS — O03.4 INCOMPLETE SPONTANEOUS ABORTION: ICD-10-CM

## 2022-03-11 LAB — B-HCG SERPL-ACNC: 212 MIU/ML

## 2022-03-11 PROCEDURE — 36415 COLL VENOUS BLD VENIPUNCTURE: CPT

## 2022-03-11 PROCEDURE — 84702 CHORIONIC GONADOTROPIN TEST: CPT

## 2022-03-11 NOTE — TELEPHONE ENCOUNTER
Spoke to patient, informed patient TM sent a message on 3/5 regarding results. Advised she is to repeat hcg levels 1 week after prior draw. Patient advised order already in the system and we will call her once those have resulted.  Patient agrees with plan and verbally understands

## 2022-03-17 ENCOUNTER — TELEPHONE (OUTPATIENT)
Dept: OBGYN CLINIC | Facility: CLINIC | Age: 42
End: 2022-03-17

## 2022-03-17 NOTE — TELEPHONE ENCOUNTER
Notified pt of TM instructions. appt scheduled for next weds (pt unable to schedule tomorrow). Pt to complete next quant level tomorrow. Pt states she is very unhappy about the communication regarding he weekly results. Pt states she calls for results & is juts told to repeat. Now suddenly, it becomes urgent. Empathy given. Reassured pt that trends are important & now that one is seen, TM can discuss a poc with her.  Pt verbalized an understanding & agrees w/ plan

## 2022-03-17 NOTE — TELEPHONE ENCOUNTER
----- Message from Darek January, CURTIS sent at 3/16/2022  6:33 PM CDT -----  Can you please call this patient for me? I was unable to send [employer] message and received patient's voicemail so did not leave detailed message. I would like to see her back in the office Friday in [facility] or next Wednesday in [facility] for a pelvic exam. Her bHCG levels are not dropping as expected. She should still get her weekly bHCG level drawn as scheduled in the meantime.

## 2022-03-18 ENCOUNTER — LAB ENCOUNTER (OUTPATIENT)
Dept: LAB | Facility: HOSPITAL | Age: 42
End: 2022-03-18
Attending: ADVANCED PRACTICE MIDWIFE
Payer: COMMERCIAL

## 2022-03-18 DIAGNOSIS — O03.4 INCOMPLETE SPONTANEOUS ABORTION: ICD-10-CM

## 2022-03-18 LAB — B-HCG SERPL-ACNC: 180 MIU/ML

## 2022-03-18 PROCEDURE — 36415 COLL VENOUS BLD VENIPUNCTURE: CPT

## 2022-03-18 PROCEDURE — 84702 CHORIONIC GONADOTROPIN TEST: CPT

## 2022-03-23 ENCOUNTER — OFFICE VISIT (OUTPATIENT)
Dept: OBGYN CLINIC | Facility: CLINIC | Age: 42
End: 2022-03-23
Payer: COMMERCIAL

## 2022-03-23 VITALS
WEIGHT: 133 LBS | BODY MASS INDEX: 24 KG/M2 | HEART RATE: 81 BPM | DIASTOLIC BLOOD PRESSURE: 74 MMHG | SYSTOLIC BLOOD PRESSURE: 115 MMHG

## 2022-03-23 DIAGNOSIS — O03.4 INCOMPLETE SPONTANEOUS ABORTION: Primary | ICD-10-CM

## 2022-03-23 PROCEDURE — 3074F SYST BP LT 130 MM HG: CPT | Performed by: ADVANCED PRACTICE MIDWIFE

## 2022-03-23 PROCEDURE — 3078F DIAST BP <80 MM HG: CPT | Performed by: ADVANCED PRACTICE MIDWIFE

## 2022-03-23 PROCEDURE — 99213 OFFICE O/P EST LOW 20 MIN: CPT | Performed by: ADVANCED PRACTICE MIDWIFE

## 2022-03-25 ENCOUNTER — LAB ENCOUNTER (OUTPATIENT)
Dept: LAB | Facility: HOSPITAL | Age: 42
End: 2022-03-25
Attending: ADVANCED PRACTICE MIDWIFE
Payer: COMMERCIAL

## 2022-03-25 DIAGNOSIS — O03.4 INCOMPLETE SPONTANEOUS ABORTION: ICD-10-CM

## 2022-03-25 LAB
B-HCG SERPL-ACNC: 126 MIU/ML
DEPRECATED RDW RBC AUTO: 38.7 FL (ref 35.1–46.3)
ERYTHROCYTE [DISTWIDTH] IN BLOOD BY AUTOMATED COUNT: 11.5 % (ref 11–15)
HCT VFR BLD AUTO: 37.8 %
HGB BLD-MCNC: 12.5 G/DL
MCH RBC QN AUTO: 30.3 PG (ref 26–34)
MCHC RBC AUTO-ENTMCNC: 33.1 G/DL (ref 31–37)
MCV RBC AUTO: 91.7 FL
PLATELET # BLD AUTO: 252 10(3)UL (ref 150–450)
RBC # BLD AUTO: 4.12 X10(6)UL
WBC # BLD AUTO: 5.9 X10(3) UL (ref 4–11)

## 2022-03-25 PROCEDURE — 85027 COMPLETE CBC AUTOMATED: CPT

## 2022-03-25 PROCEDURE — 36415 COLL VENOUS BLD VENIPUNCTURE: CPT

## 2022-03-25 PROCEDURE — 84702 CHORIONIC GONADOTROPIN TEST: CPT

## 2022-03-30 ENCOUNTER — TELEPHONE (OUTPATIENT)
Dept: OBGYN CLINIC | Facility: CLINIC | Age: 42
End: 2022-03-30

## 2022-03-30 NOTE — TELEPHONE ENCOUNTER
Attempted to reach pt by phone regarding most recent lab results. No answer. Left brief VM. Will try her back.

## 2022-04-07 ENCOUNTER — TELEPHONE (OUTPATIENT)
Dept: OBGYN CLINIC | Facility: CLINIC | Age: 42
End: 2022-04-07

## 2022-04-07 NOTE — TELEPHONE ENCOUNTER
Advised pt that TM attempted to call her last week & lm. Pt asking what the quant levels were. Pt states she will go to lab tomorrow to complete.  Pt verbalized an understanding & agrees w/ plan    Routed to TM

## 2022-04-08 ENCOUNTER — TELEPHONE (OUTPATIENT)
Dept: OBGYN CLINIC | Facility: CLINIC | Age: 42
End: 2022-04-08

## 2022-04-08 ENCOUNTER — LAB ENCOUNTER (OUTPATIENT)
Dept: LAB | Facility: HOSPITAL | Age: 42
End: 2022-04-08
Attending: ADVANCED PRACTICE MIDWIFE
Payer: COMMERCIAL

## 2022-04-08 DIAGNOSIS — O03.4 INCOMPLETE SPONTANEOUS ABORTION: ICD-10-CM

## 2022-04-08 LAB — B-HCG SERPL-ACNC: 107 MIU/ML

## 2022-04-08 PROCEDURE — 36415 COLL VENOUS BLD VENIPUNCTURE: CPT

## 2022-04-08 PROCEDURE — 84702 CHORIONIC GONADOTROPIN TEST: CPT

## 2022-04-09 NOTE — TELEPHONE ENCOUNTER
Advised pt per TM that levels are not dropping as expected & she should complete ultrasound to rule out any retained products of conception. Order placed as stat. Pt to complete asap. Call transferred to CS.  Pt verbalized an understanding g& agrees w/ plan

## 2022-04-11 ENCOUNTER — TELEPHONE (OUTPATIENT)
Dept: OBGYN CLINIC | Facility: CLINIC | Age: 42
End: 2022-04-11

## 2022-04-11 ENCOUNTER — HOSPITAL ENCOUNTER (OUTPATIENT)
Dept: ULTRASOUND IMAGING | Facility: HOSPITAL | Age: 42
Discharge: HOME | End: 2022-04-11
Attending: ADVANCED PRACTICE MIDWIFE
Payer: COMMERCIAL

## 2022-04-11 DIAGNOSIS — O03.4 RETAINED PRODUCTS OF CONCEPTION AFTER MISCARRIAGE: ICD-10-CM

## 2022-04-11 DIAGNOSIS — O03.9 SPONTANEOUS ABORTION: ICD-10-CM

## 2022-04-11 PROCEDURE — 76856 US EXAM PELVIC COMPLETE: CPT | Performed by: ADVANCED PRACTICE MIDWIFE

## 2022-04-11 PROCEDURE — 93975 VASCULAR STUDY: CPT | Performed by: ADVANCED PRACTICE MIDWIFE

## 2022-04-11 PROCEDURE — 76830 TRANSVAGINAL US NON-OB: CPT | Performed by: ADVANCED PRACTICE MIDWIFE

## 2022-04-11 NOTE — TELEPHONE ENCOUNTER
Pt advised per [facility], ultrasound showed retained products of conception and recommendation is for pt to have D&C. Per [facility] pt can have video appt today with Alcira Paris or schedule appt with [facility] tomorrow or Wednesday to discuss D&C. Pt can also have referral to Dr. Dwayne Livingston if pt desires. Pt desires appt tomorrow with [facility]. Appt scheduled. Pt agreed and voiced understanding.

## 2022-04-12 ENCOUNTER — OFFICE VISIT (OUTPATIENT)
Dept: OBGYN CLINIC | Facility: CLINIC | Age: 42
End: 2022-04-12
Payer: COMMERCIAL

## 2022-04-12 VITALS
SYSTOLIC BLOOD PRESSURE: 120 MMHG | WEIGHT: 133 LBS | DIASTOLIC BLOOD PRESSURE: 79 MMHG | BODY MASS INDEX: 24 KG/M2 | HEART RATE: 92 BPM

## 2022-04-12 DIAGNOSIS — O02.1 MISSED ABORTION: Primary | ICD-10-CM

## 2022-04-12 DIAGNOSIS — O03.4 RETAINED PRODUCTS OF CONCEPTION AFTER MISCARRIAGE: ICD-10-CM

## 2022-04-12 PROCEDURE — 3074F SYST BP LT 130 MM HG: CPT | Performed by: ADVANCED PRACTICE MIDWIFE

## 2022-04-12 PROCEDURE — 99214 OFFICE O/P EST MOD 30 MIN: CPT | Performed by: ADVANCED PRACTICE MIDWIFE

## 2022-04-12 PROCEDURE — 3078F DIAST BP <80 MM HG: CPT | Performed by: ADVANCED PRACTICE MIDWIFE

## 2022-04-13 ENCOUNTER — LAB ENCOUNTER (OUTPATIENT)
Dept: LAB | Facility: HOSPITAL | Age: 42
End: 2022-04-13
Attending: ADVANCED PRACTICE MIDWIFE
Payer: COMMERCIAL

## 2022-04-13 ENCOUNTER — TELEPHONE (OUTPATIENT)
Dept: OBGYN CLINIC | Facility: CLINIC | Age: 42
End: 2022-04-13

## 2022-04-13 DIAGNOSIS — O03.9 SAB (SPONTANEOUS ABORTION): ICD-10-CM

## 2022-04-13 PROBLEM — O03.4 RETAINED PRODUCTS OF CONCEPTION AFTER MISCARRIAGE: Status: ACTIVE | Noted: 2022-04-13

## 2022-04-13 LAB
B-HCG SERPL-ACNC: 52 MIU/ML
BASOPHILS # BLD AUTO: 0.02 X10(3) UL (ref 0–0.2)
BASOPHILS NFR BLD AUTO: 0.3 %
DEPRECATED RDW RBC AUTO: 39.6 FL (ref 35.1–46.3)
EOSINOPHIL # BLD AUTO: 0.07 X10(3) UL (ref 0–0.7)
EOSINOPHIL NFR BLD AUTO: 1 %
ERYTHROCYTE [DISTWIDTH] IN BLOOD BY AUTOMATED COUNT: 11.7 % (ref 11–15)
HCT VFR BLD AUTO: 39.8 %
HGB BLD-MCNC: 13.1 G/DL
IMM GRANULOCYTES # BLD AUTO: 0.02 X10(3) UL (ref 0–1)
IMM GRANULOCYTES NFR BLD: 0.3 %
LYMPHOCYTES # BLD AUTO: 1.58 X10(3) UL (ref 1–4)
LYMPHOCYTES NFR BLD AUTO: 21.6 %
MCH RBC QN AUTO: 30.3 PG (ref 26–34)
MCHC RBC AUTO-ENTMCNC: 32.9 G/DL (ref 31–37)
MCV RBC AUTO: 91.9 FL
MONOCYTES # BLD AUTO: 0.6 X10(3) UL (ref 0.1–1)
MONOCYTES NFR BLD AUTO: 8.2 %
NEUTROPHILS # BLD AUTO: 5.01 X10 (3) UL (ref 1.5–7.7)
NEUTROPHILS # BLD AUTO: 5.01 X10(3) UL (ref 1.5–7.7)
NEUTROPHILS NFR BLD AUTO: 68.6 %
PLATELET # BLD AUTO: 256 10(3)UL (ref 150–450)
RBC # BLD AUTO: 4.33 X10(6)UL
WBC # BLD AUTO: 7.3 X10(3) UL (ref 4–11)

## 2022-04-13 PROCEDURE — 36415 COLL VENOUS BLD VENIPUNCTURE: CPT

## 2022-04-13 PROCEDURE — 84702 CHORIONIC GONADOTROPIN TEST: CPT

## 2022-04-13 PROCEDURE — 85025 COMPLETE CBC W/AUTO DIFF WBC: CPT

## 2022-04-13 NOTE — TELEPHONE ENCOUNTER
----- Message from Herman Sy CNM sent at 4/13/2022 12:21 PM CDT -----  Attempted to reach pt by phone regarding ultrasound results but no answer. Please contact her and inform her of need for D&C. She should schedule with [facility] LAUREN, next available.

## 2022-04-13 NOTE — TELEPHONE ENCOUNTER
JF spoke w/ MES. Pt will be scheduled tomorrow between 4-5p. Pt does not need ABO Rh or antibody screen, order canceled, but needs quant & cbc per MES. Orders confirmed pt notified & instructed to answer phone if anyone calls. Pt states she gets terrible cell phone service at work & will need her office number called before 3p & after 3p, her cell can be called. Discussed that preadmission testing will contact pt w/ further instructions. Reviewed NPO status.   Pt verbalized an understanding & agrees w/ plan    Josselyn Patch at [address] [place] [address] office notified of pt's phone #'s to call

## 2022-04-13 NOTE — TELEPHONE ENCOUNTER
Please call pt and ask her if she has decided on her procedure (D&C missed ab) for this evening with DR [employer] or tomorrow.   Will need an HCG and Type and antibody screen today  If yes for today NOB and best to have the labs drawn this am

## 2022-04-13 NOTE — TELEPHONE ENCOUNTER
Pt notified of [facility] instructions. Pt desires to have D&C completed Thursday after 4p or Friday. Will complete labs today. MES notified. JEN paged & lm on cellphone vm per MES req. [facility] would like to talk to JF. Pt verbalized an understanding & agrees w/ plan    Spoke w/ Jazzmine Smallwood at [facility] office. JF in surgery all day but will contact office later. Surgical admission staff will contact pt & order covid testing    Call pt back on work # until 3p & then cell # after that.  Per pt, we can leave a detailed message

## 2022-04-14 ENCOUNTER — ANESTHESIA (OUTPATIENT)
Dept: SURGERY | Facility: HOSPITAL | Age: 42
End: 2022-04-14
Payer: COMMERCIAL

## 2022-04-14 ENCOUNTER — APPOINTMENT (OUTPATIENT)
Dept: CT IMAGING | Facility: HOSPITAL | Age: 42
End: 2022-04-14
Attending: Other
Payer: COMMERCIAL

## 2022-04-14 ENCOUNTER — APPOINTMENT (OUTPATIENT)
Dept: CT IMAGING | Facility: HOSPITAL | Age: 42
End: 2022-04-14
Attending: ANESTHESIOLOGY
Payer: COMMERCIAL

## 2022-04-14 ENCOUNTER — ANESTHESIA EVENT (OUTPATIENT)
Dept: SURGERY | Facility: HOSPITAL | Age: 42
End: 2022-04-14
Payer: COMMERCIAL

## 2022-04-14 ENCOUNTER — HOSPITAL ENCOUNTER (OUTPATIENT)
Facility: HOSPITAL | Age: 42
Setting detail: OBSERVATION
Discharge: HOME | End: 2022-04-15
Attending: OBSTETRICS & GYNECOLOGY | Admitting: OBSTETRICS & GYNECOLOGY
Payer: COMMERCIAL

## 2022-04-14 DIAGNOSIS — O03.4 RETAINED PRODUCTS OF CONCEPTION FOLLOWING ABORTION: ICD-10-CM

## 2022-04-14 PROBLEM — G45.9 TIA (TRANSIENT ISCHEMIC ATTACK): Status: ACTIVE | Noted: 2022-04-14

## 2022-04-14 LAB
ANTIBODY SCREEN: POSITIVE
BASOPHILS # BLD AUTO: 0.01 X10(3) UL (ref 0–0.2)
BASOPHILS NFR BLD AUTO: 0.1 %
CHOLEST SERPL-MCNC: 184 MG/DL (ref ?–200)
DEPRECATED RDW RBC AUTO: 38.6 FL (ref 35.1–46.3)
DIRECT COOMBS POLY: NEGATIVE
EOSINOPHIL # BLD AUTO: 0 X10(3) UL (ref 0–0.7)
EOSINOPHIL NFR BLD AUTO: 0 %
ERYTHROCYTE [DISTWIDTH] IN BLOOD BY AUTOMATED COUNT: 11.8 % (ref 11–15)
GLUCOSE BLDC GLUCOMTR-MCNC: 112 MG/DL (ref 70–99)
HCT VFR BLD AUTO: 37.6 %
HDLC SERPL-MCNC: 60 MG/DL (ref 40–59)
HGB BLD-MCNC: 12.6 G/DL
IMM GRANULOCYTES # BLD AUTO: 0.05 X10(3) UL (ref 0–1)
IMM GRANULOCYTES NFR BLD: 0.4 %
LDLC SERPL CALC-MCNC: 116 MG/DL (ref ?–100)
LYMPHOCYTES # BLD AUTO: 0.67 X10(3) UL (ref 1–4)
LYMPHOCYTES NFR BLD AUTO: 5.9 %
MCH RBC QN AUTO: 30.2 PG (ref 26–34)
MCHC RBC AUTO-ENTMCNC: 33.5 G/DL (ref 31–37)
MCV RBC AUTO: 90.2 FL
MONOCYTES # BLD AUTO: 0.12 X10(3) UL (ref 0.1–1)
MONOCYTES NFR BLD AUTO: 1.1 %
NEUTROPHILS # BLD AUTO: 10.52 X10 (3) UL (ref 1.5–7.7)
NEUTROPHILS # BLD AUTO: 10.52 X10(3) UL (ref 1.5–7.7)
NEUTROPHILS NFR BLD AUTO: 92.5 %
NONHDLC SERPL-MCNC: 124 MG/DL (ref ?–130)
PLATELET # BLD AUTO: 238 10(3)UL (ref 150–450)
RBC # BLD AUTO: 4.17 X10(6)UL
RH BLOOD TYPE: NEGATIVE
SARS-COV-2 RNA RESP QL NAA+PROBE: NOT DETECTED
TRIGL SERPL-MCNC: 38 MG/DL (ref 30–149)
VLDLC SERPL CALC-MCNC: 7 MG/DL (ref 0–30)
WBC # BLD AUTO: 11.4 X10(3) UL (ref 4–11)

## 2022-04-14 PROCEDURE — 80061 LIPID PANEL: CPT | Performed by: OTHER

## 2022-04-14 PROCEDURE — 88305 TISSUE EXAM BY PATHOLOGIST: CPT | Performed by: OBSTETRICS & GYNECOLOGY

## 2022-04-14 PROCEDURE — 93005 ELECTROCARDIOGRAM TRACING: CPT

## 2022-04-14 PROCEDURE — 86880 COOMBS TEST DIRECT: CPT | Performed by: OBSTETRICS & GYNECOLOGY

## 2022-04-14 PROCEDURE — 93010 ELECTROCARDIOGRAM REPORT: CPT | Performed by: EMERGENCY MEDICINE

## 2022-04-14 PROCEDURE — 86870 RBC ANTIBODY IDENTIFICATION: CPT | Performed by: OBSTETRICS & GYNECOLOGY

## 2022-04-14 PROCEDURE — 85025 COMPLETE CBC W/AUTO DIFF WBC: CPT | Performed by: EMERGENCY MEDICINE

## 2022-04-14 PROCEDURE — 70498 CT ANGIOGRAPHY NECK: CPT | Performed by: OTHER

## 2022-04-14 PROCEDURE — 70496 CT ANGIOGRAPHY HEAD: CPT | Performed by: OTHER

## 2022-04-14 PROCEDURE — 86850 RBC ANTIBODY SCREEN: CPT | Performed by: OBSTETRICS & GYNECOLOGY

## 2022-04-14 PROCEDURE — 86077 PHYS BLOOD BANK SERV XMATCH: CPT | Performed by: OBSTETRICS & GYNECOLOGY

## 2022-04-14 PROCEDURE — 36415 COLL VENOUS BLD VENIPUNCTURE: CPT | Performed by: OBSTETRICS & GYNECOLOGY

## 2022-04-14 PROCEDURE — 83036 HEMOGLOBIN GLYCOSYLATED A1C: CPT | Performed by: OTHER

## 2022-04-14 PROCEDURE — 10D18ZZ EXTRACTION OF PRODUCTS OF CONCEPTION, RETAINED, VIA NATURAL OR ARTIFICIAL OPENING ENDOSCOPIC: ICD-10-PCS | Performed by: OBSTETRICS & GYNECOLOGY

## 2022-04-14 PROCEDURE — 70450 CT HEAD/BRAIN W/O DYE: CPT | Performed by: ANESTHESIOLOGY

## 2022-04-14 PROCEDURE — 86901 BLOOD TYPING SEROLOGIC RH(D): CPT | Performed by: OBSTETRICS & GYNECOLOGY

## 2022-04-14 PROCEDURE — 82962 GLUCOSE BLOOD TEST: CPT

## 2022-04-14 PROCEDURE — 86900 BLOOD TYPING SEROLOGIC ABO: CPT | Performed by: OBSTETRICS & GYNECOLOGY

## 2022-04-14 RX ORDER — HYDROMORPHONE HYDROCHLORIDE 1 MG/ML
0.4 INJECTION, SOLUTION INTRAMUSCULAR; INTRAVENOUS; SUBCUTANEOUS EVERY 5 MIN PRN
Status: DISCONTINUED | OUTPATIENT
Start: 2022-04-14 | End: 2022-04-14 | Stop reason: HOSPADM

## 2022-04-14 RX ORDER — PROCHLORPERAZINE EDISYLATE 5 MG/ML
5 INJECTION INTRAMUSCULAR; INTRAVENOUS EVERY 8 HOURS PRN
Status: CANCELLED | OUTPATIENT
Start: 2022-04-14

## 2022-04-14 RX ORDER — ACETAMINOPHEN 500 MG
1000 TABLET ORAL ONCE
Status: COMPLETED | OUTPATIENT
Start: 2022-04-14 | End: 2022-04-14

## 2022-04-14 RX ORDER — MORPHINE SULFATE 4 MG/ML
4 INJECTION, SOLUTION INTRAMUSCULAR; INTRAVENOUS EVERY 10 MIN PRN
Status: DISCONTINUED | OUTPATIENT
Start: 2022-04-14 | End: 2022-04-14 | Stop reason: HOSPADM

## 2022-04-14 RX ORDER — ACETAMINOPHEN 325 MG/1
650 TABLET ORAL EVERY 4 HOURS PRN
Status: CANCELLED | OUTPATIENT
Start: 2022-04-14

## 2022-04-14 RX ORDER — SODIUM CHLORIDE, SODIUM LACTATE, POTASSIUM CHLORIDE, CALCIUM CHLORIDE 600; 310; 30; 20 MG/100ML; MG/100ML; MG/100ML; MG/100ML
INJECTION, SOLUTION INTRAVENOUS CONTINUOUS
Status: DISCONTINUED | OUTPATIENT
Start: 2022-04-14 | End: 2022-04-15

## 2022-04-14 RX ORDER — ACETAMINOPHEN 325 MG/1
650 TABLET ORAL EVERY 4 HOURS PRN
Status: DISCONTINUED | OUTPATIENT
Start: 2022-04-14 | End: 2022-04-15

## 2022-04-14 RX ORDER — HYDROCODONE BITARTRATE AND ACETAMINOPHEN 5; 325 MG/1; MG/1
1 TABLET ORAL EVERY 4 HOURS PRN
Status: CANCELLED | OUTPATIENT
Start: 2022-04-14

## 2022-04-14 RX ORDER — ACETAMINOPHEN 650 MG/1
650 SUPPOSITORY RECTAL EVERY 4 HOURS PRN
Status: DISCONTINUED | OUTPATIENT
Start: 2022-04-14 | End: 2022-04-15

## 2022-04-14 RX ORDER — SODIUM CHLORIDE, SODIUM LACTATE, POTASSIUM CHLORIDE, CALCIUM CHLORIDE 600; 310; 30; 20 MG/100ML; MG/100ML; MG/100ML; MG/100ML
INJECTION, SOLUTION INTRAVENOUS CONTINUOUS
Status: DISCONTINUED | OUTPATIENT
Start: 2022-04-14 | End: 2022-04-14 | Stop reason: HOSPADM

## 2022-04-14 RX ORDER — SODIUM CHLORIDE 9 MG/ML
INJECTION, SOLUTION INTRAVENOUS CONTINUOUS
Status: DISCONTINUED | OUTPATIENT
Start: 2022-04-14 | End: 2022-04-15

## 2022-04-14 RX ORDER — ONDANSETRON 2 MG/ML
4 INJECTION INTRAMUSCULAR; INTRAVENOUS ONCE AS NEEDED
Status: COMPLETED | OUTPATIENT
Start: 2022-04-14 | End: 2022-04-14

## 2022-04-14 RX ORDER — HYDROCODONE BITARTRATE AND ACETAMINOPHEN 5; 325 MG/1; MG/1
2 TABLET ORAL EVERY 4 HOURS PRN
Status: CANCELLED | OUTPATIENT
Start: 2022-04-14

## 2022-04-14 RX ORDER — HYDROMORPHONE HYDROCHLORIDE 1 MG/ML
0.2 INJECTION, SOLUTION INTRAMUSCULAR; INTRAVENOUS; SUBCUTANEOUS EVERY 5 MIN PRN
Status: DISCONTINUED | OUTPATIENT
Start: 2022-04-14 | End: 2022-04-14 | Stop reason: HOSPADM

## 2022-04-14 RX ORDER — HYDROCODONE BITARTRATE AND ACETAMINOPHEN 5; 325 MG/1; MG/1
2 TABLET ORAL AS NEEDED
Status: DISCONTINUED | OUTPATIENT
Start: 2022-04-14 | End: 2022-04-14 | Stop reason: HOSPADM

## 2022-04-14 RX ORDER — ASPIRIN 300 MG/1
300 SUPPOSITORY RECTAL DAILY
Status: DISCONTINUED | OUTPATIENT
Start: 2022-04-14 | End: 2022-04-15

## 2022-04-14 RX ORDER — MORPHINE SULFATE 10 MG/ML
6 INJECTION, SOLUTION INTRAMUSCULAR; INTRAVENOUS EVERY 10 MIN PRN
Status: DISCONTINUED | OUTPATIENT
Start: 2022-04-14 | End: 2022-04-14 | Stop reason: HOSPADM

## 2022-04-14 RX ORDER — NALOXONE HYDROCHLORIDE 0.4 MG/ML
80 INJECTION, SOLUTION INTRAMUSCULAR; INTRAVENOUS; SUBCUTANEOUS AS NEEDED
Status: DISCONTINUED | OUTPATIENT
Start: 2022-04-14 | End: 2022-04-14 | Stop reason: HOSPADM

## 2022-04-14 RX ORDER — SODIUM CHLORIDE 9 MG/ML
INJECTION, SOLUTION INTRAVENOUS CONTINUOUS
Status: CANCELLED | OUTPATIENT
Start: 2022-04-14

## 2022-04-14 RX ORDER — DEXAMETHASONE SODIUM PHOSPHATE 4 MG/ML
VIAL (ML) INJECTION AS NEEDED
Status: DISCONTINUED | OUTPATIENT
Start: 2022-04-14 | End: 2022-04-14 | Stop reason: SURG

## 2022-04-14 RX ORDER — MORPHINE SULFATE 4 MG/ML
2 INJECTION, SOLUTION INTRAMUSCULAR; INTRAVENOUS EVERY 10 MIN PRN
Status: DISCONTINUED | OUTPATIENT
Start: 2022-04-14 | End: 2022-04-14 | Stop reason: HOSPADM

## 2022-04-14 RX ORDER — PROCHLORPERAZINE EDISYLATE 5 MG/ML
5 INJECTION INTRAMUSCULAR; INTRAVENOUS ONCE AS NEEDED
Status: DISCONTINUED | OUTPATIENT
Start: 2022-04-14 | End: 2022-04-14 | Stop reason: HOSPADM

## 2022-04-14 RX ORDER — HALOPERIDOL 5 MG/ML
0.25 INJECTION INTRAMUSCULAR ONCE AS NEEDED
Status: DISCONTINUED | OUTPATIENT
Start: 2022-04-14 | End: 2022-04-14 | Stop reason: HOSPADM

## 2022-04-14 RX ORDER — HYDROCODONE BITARTRATE AND ACETAMINOPHEN 5; 325 MG/1; MG/1
1 TABLET ORAL AS NEEDED
Status: DISCONTINUED | OUTPATIENT
Start: 2022-04-14 | End: 2022-04-14 | Stop reason: HOSPADM

## 2022-04-14 RX ORDER — HEPARIN SODIUM 5000 [USP'U]/ML
5000 INJECTION, SOLUTION INTRAVENOUS; SUBCUTANEOUS EVERY 12 HOURS SCHEDULED
Status: DISCONTINUED | OUTPATIENT
Start: 2022-04-14 | End: 2022-04-15

## 2022-04-14 RX ORDER — IBUPROFEN 600 MG/1
600 TABLET ORAL EVERY 6 HOURS PRN
Qty: 30 TABLET | Refills: 0 | Status: SHIPPED | OUTPATIENT
Start: 2022-04-14 | End: 2022-04-22

## 2022-04-14 RX ORDER — LIDOCAINE HYDROCHLORIDE 10 MG/ML
INJECTION, SOLUTION EPIDURAL; INFILTRATION; INTRACAUDAL; PERINEURAL AS NEEDED
Status: DISCONTINUED | OUTPATIENT
Start: 2022-04-14 | End: 2022-04-14 | Stop reason: SURG

## 2022-04-14 RX ORDER — ONDANSETRON 2 MG/ML
INJECTION INTRAMUSCULAR; INTRAVENOUS AS NEEDED
Status: DISCONTINUED | OUTPATIENT
Start: 2022-04-14 | End: 2022-04-14 | Stop reason: SURG

## 2022-04-14 RX ORDER — ATORVASTATIN CALCIUM 40 MG/1
40 TABLET, FILM COATED ORAL NIGHTLY
Status: DISCONTINUED | OUTPATIENT
Start: 2022-04-16 | End: 2022-04-15

## 2022-04-14 RX ORDER — ASPIRIN 325 MG
325 TABLET ORAL DAILY
Status: DISCONTINUED | OUTPATIENT
Start: 2022-04-14 | End: 2022-04-15

## 2022-04-14 RX ORDER — ONDANSETRON 2 MG/ML
4 INJECTION INTRAMUSCULAR; INTRAVENOUS EVERY 6 HOURS PRN
Status: CANCELLED | OUTPATIENT
Start: 2022-04-14

## 2022-04-14 RX ORDER — LABETALOL HYDROCHLORIDE 5 MG/ML
10 INJECTION, SOLUTION INTRAVENOUS EVERY 10 MIN PRN
Status: DISCONTINUED | OUTPATIENT
Start: 2022-04-14 | End: 2022-04-15

## 2022-04-14 RX ORDER — HYDROMORPHONE HYDROCHLORIDE 1 MG/ML
0.6 INJECTION, SOLUTION INTRAMUSCULAR; INTRAVENOUS; SUBCUTANEOUS EVERY 5 MIN PRN
Status: DISCONTINUED | OUTPATIENT
Start: 2022-04-14 | End: 2022-04-14 | Stop reason: HOSPADM

## 2022-04-14 RX ORDER — HYDRALAZINE HYDROCHLORIDE 20 MG/ML
10 INJECTION INTRAMUSCULAR; INTRAVENOUS EVERY 2 HOUR PRN
Status: DISCONTINUED | OUTPATIENT
Start: 2022-04-14 | End: 2022-04-15

## 2022-04-14 RX ADMIN — DEXAMETHASONE SODIUM PHOSPHATE 4 MG: 4 MG/ML VIAL (ML) INJECTION at 18:21:00

## 2022-04-14 RX ADMIN — LIDOCAINE HYDROCHLORIDE 50 MG: 10 INJECTION, SOLUTION EPIDURAL; INFILTRATION; INTRACAUDAL; PERINEURAL at 18:21:00

## 2022-04-14 RX ADMIN — ONDANSETRON 4 MG: 2 INJECTION INTRAMUSCULAR; INTRAVENOUS at 18:21:00

## 2022-04-14 NOTE — TELEPHONE ENCOUNTER
Left detailed message indicating someone from [facility] will call tomorrow morning to arrange time and give instruction for the D&C. Left message indicating she will need to be NPO and patient would have to arrive at the hospital 2 hours prior to the procedure time.

## 2022-04-14 NOTE — TELEPHONE ENCOUNTER
Called and spoke with Urvashi Doe in the [employer]. Per Urvashi Doe, pt will be added today but unable to give a time right now. Informed him that pt desires after 4 pm.  Per Urvashi Doe, can not guarantee a time but he will call back. Add on surgery form faxed.      Attempted to call pt, CAYLA

## 2022-04-14 NOTE — TELEPHONE ENCOUNTER
Called and spoke to pt. Informed her that she will be having Hysteroscopy D&C today. OR was unable to give time when scheduled. Informed pt that I will call her back once I know. Pt informed to have nothing to eat or drink.  Aware that she will receive call from [facility] and that she will a need COVID test.

## 2022-04-14 NOTE — TELEPHONE ENCOUNTER
Please schedule patient for hysteroscopy with D&C as an add-on today on 4/14/2022. The diagnosis is retained products of conception. Please schedule for after 4 PM.  Patient may also be scheduled for tomorrow if that works better for her.

## 2022-04-14 NOTE — ANESTHESIA PROCEDURE NOTES
Airway  Date/Time: 4/14/2022 6:23 PM  Urgency: Elective    Airway not difficult    General Information and Staff    Patient location during procedure: OR  Anesthesiologist: Mauri Shepard MD  Resident/CRNA: Chelsea Jose CRNA  Performed: CRNA     Indications and Patient Condition  Indications for airway management: anesthesia  Sedation level: deep  Preoxygenated: yes  Patient position: sniffing  Mask difficulty assessment: 1 - vent by mask    Final Airway Details  Final airway type: supraglottic airway      Successful airway: classic  Size 4      Number of attempts at approach: 1  Number of other approaches attempted: 0

## 2022-04-15 ENCOUNTER — APPOINTMENT (OUTPATIENT)
Dept: CV DIAGNOSTICS | Facility: HOSPITAL | Age: 42
End: 2022-04-15
Attending: Other
Payer: COMMERCIAL

## 2022-04-15 ENCOUNTER — APPOINTMENT (OUTPATIENT)
Dept: MRI IMAGING | Facility: HOSPITAL | Age: 42
End: 2022-04-15
Attending: Other
Payer: COMMERCIAL

## 2022-04-15 VITALS
DIASTOLIC BLOOD PRESSURE: 58 MMHG | TEMPERATURE: 99 F | SYSTOLIC BLOOD PRESSURE: 104 MMHG | HEIGHT: 63 IN | WEIGHT: 136.19 LBS | BODY MASS INDEX: 24.13 KG/M2 | RESPIRATION RATE: 18 BRPM | HEART RATE: 86 BPM | OXYGEN SATURATION: 98 %

## 2022-04-15 PROBLEM — R53.1 ACUTE LEFT-SIDED WEAKNESS: Status: ACTIVE | Noted: 2022-04-15

## 2022-04-15 PROBLEM — O03.4 RETAINED PRODUCTS OF CONCEPTION FOLLOWING ABORTION: Status: ACTIVE | Noted: 2022-04-13

## 2022-04-15 LAB
EST. AVERAGE GLUCOSE BLD GHB EST-MCNC: 97 MG/DL (ref 68–126)
GLUCOSE BLDC GLUCOMTR-MCNC: 82 MG/DL (ref 70–99)
HBA1C MFR BLD: 5 % (ref ?–5.7)

## 2022-04-15 PROCEDURE — 93306 TTE W/DOPPLER COMPLETE: CPT | Performed by: OTHER

## 2022-04-15 PROCEDURE — 97162 PT EVAL MOD COMPLEX 30 MIN: CPT

## 2022-04-15 PROCEDURE — 97530 THERAPEUTIC ACTIVITIES: CPT

## 2022-04-15 PROCEDURE — 92610 EVALUATE SWALLOWING FUNCTION: CPT

## 2022-04-15 PROCEDURE — 97165 OT EVAL LOW COMPLEX 30 MIN: CPT

## 2022-04-15 PROCEDURE — 70551 MRI BRAIN STEM W/O DYE: CPT | Performed by: OTHER

## 2022-04-15 PROCEDURE — 97116 GAIT TRAINING THERAPY: CPT

## 2022-04-15 PROCEDURE — 82962 GLUCOSE BLOOD TEST: CPT

## 2022-04-15 NOTE — PROGRESS NOTES
Called by the PACU nurses to evaluate patient for left-sided weakness  Patient underwent  D&C for retained products of conception  Uncomplicated anesthetic patient states that when she woke up she noticed her left arm and her left leg to be weaker than the right side  She is fully awake and alert but states that her left side is feels heavy compared to the right side  Physical exam  Vitals are stable lungs clear to all station heart regular rate and rhythm  Left arm shows generalized weakness hand grasp is decreased compared to the righ  t left leg dorsiflexion is intact generally weaker compared to the right side  Tongue is midline pupils are reactive and she is able to talk without slurring and answers all questions appropriately  Will call for a stroke alert and get the process started to rule out any neurological process this has been initiated by the PACU nurses

## 2022-04-15 NOTE — CM/SW NOTE
[facility] per Stroke Protocol. Per RN Rounds, pt has remained independent w/ ambulation and is not showing any deficits at this time. Pt is on RA w/ stable O2 sats. Per chart review, pt is from home w/ spouse and is independent at baseline. At this time, no needs are anticipated upon DC from [address] [place] [address]. Pt anticipated to DC today 4/14 pending results of ECHO. PLAN: Home, no needs      SW/CM to remain available for support and/or discharge planning.          Denita Peace, MSW, [facility]

## 2022-04-15 NOTE — PROGRESS NOTES
Discussed with neurology patient symptoms   Patient in CT scan as we spoke  He wants rapid response:called when patient returns to the PACU

## 2022-04-15 NOTE — CM/SW NOTE
04/15/22 1200   Discharge disposition   Expected discharge disposition Home or Self   Discharge transportation Private car     Per chart review, pt has DC order for today. Pt presenting w/ no deficits and no needs for DC. Pt is cleared from SW/CM stand point.     PLAN; Home, no needs      Gavin Sloan, POLLO, [address] [facility]

## 2022-04-15 NOTE — SIGNIFICANT EVENT
RAPID RESPONSE called for patient who woke up with weakness on l side after anesthesia. Reports it was weak, she was sent directly to CT and upon returning weakness no longer appreciable, though pt notes a sense of heaviness in l arm and leg. Otherwise awake alert nl speech. Sensation, intact, nl finger nose. She notes she is feeling more alert. No cp. No sob. -neurolgy already aware, CTA ordered, updated will admit  -spoke with hospitalist who will be admitting service.   -not tPA candidate resolution of syptoms    NIH 0 at this time

## 2022-04-15 NOTE — SLP NOTE
SLP BSE orders received and acknowledged. SLP attempt this AM for BSE, however patient off the unit for testing. SLP to f/u as pt available and as schedule permits. Thank you.   Josselyn Moore SLP  Clinician

## 2022-04-15 NOTE — OPERATIVE REPORT
[facility]    PATIENT'S NAME: Tiera Hayes   ATTENDING PHYSICIAN: Lawson Engle MD   OPERATING PHYSICIAN: Lawson Engle MD   PATIENT ACCOUNT#:   [id number]    LOCATION:  [address] [place] [address] Street #:   [id number]       YOB: 1980  ADMISSION DATE:       04/14/2022      OPERATION DATE:  04/14/2022    OPERATIVE REPORT      PREOPERATIVE DIAGNOSIS:  Suspected retained products of conception post miscarriage. POSTOPERATIVE DIAGNOSIS:  Suspected retained products of conception post miscarriage. PROCEDURE:  Hysteroscopy with removal of placenta and dilatation and curettage. ANESTHESIA:  General.    INTRAVENOUS FLUIDS:  Approximately 700 mL. URINE OUTPUT:  None. Patient was not catheterized. ESTIMATED BLOOD LOSS:  50 mL. COMPLICATIONS:  None. SPECIMENS:  Products of conception/suspected retained placenta to Pathology. FINDINGS:  Suspected retained placental tissue/products of conception seen on hysteroscopy. OPERATIVE TECHNIQUE:  After informed consent was obtained, the patient was prepped and draped in the usual sterile fashion in dorsal lithotomy position. Speculum was placed in the vagina and the cervix was grasped with a single-tooth tenaculum. The cervix was dilated. A hysteroscope was placed into the uterine cavity. The above dictated findings were visualized, with apparent retained placental tissue adherent to the uterine fundus. The MyoSure device was then used to morcellate the placental tissue under direct visualization. After the uterine cavity cleared of the placental tissue, the endometrium was curetted throughout, but mostly at the anterior fundal region. A small amount of tissue was obtained at this time. All the tissue went to Pathology together. At this point, the procedure was deemed to be complete. The instruments were removed. The lap and instrument counts were correct. The patient tolerated the procedure well.   She was extubated, awoken, and transferred to the recovery room in good condition.     Dictated By Aaliyah Chin MD  d: 04/14/2022 19:54:52  t: 04/15/2022 07:20:31  [facility] [id number]  GXE/

## 2022-04-15 NOTE — PACU NOTE
Pt was moving all extremities upon arrival to PACU, but drowsy and weak in all extremites equally. As pt woke up more and became alert, significant left sided weakness became noticeable as right side was getting stronger and left side extremites remained weak. No facial droop or speech difficulties. VSS.  and  notified. Elroy Mayo seen pt in PACU. Stroke alert was called and pt was taken to head CT w/out contrast. When pt arrived back to PACU, RRT was called per .  (neurologist) also consulted. Pt seen by Cheryle Shaggy in PACU. CTA completed.  and  updated by Cheryle Shaggy. Pt able to move all extremities equally after arriving back to PACU, but states heavy sensation on left extremities remains. EKG done. Pt' mother at bedside. Pt transferred to room 315 in stable condition.

## 2022-04-15 NOTE — BRIEF OP NOTE
[facility] UNIT  Operative Note     [address] Location: OR   CSN [id number] MRN [id number]   Admission Date 2022 Operation Date 2022   Attending Physician Jm Arredondo MD Operating Physician Hank Mehta MD, MD      Preoperative Diagnosis: Retained products of conception following  [O03.4]  1. Suspected retained products of conception post miscarriage   Postoperative Diagnosis: Retained products of conception following  [O03.4]  1.   Suspected retained products of conception post miscarriage    Procedure Performed:   HYSTEROSCOPY WITH DILATION AND CURETTAGE   Hysteroscopy with removal of retained placenta with mild sugar and dilatation curettage  Primary Surgeon: Hank Mehta MD, MD     Anesthesia: General    IVF: 700 cc    UO: None    Estimated Blood Loss: 50 cc    Surgical Findings: Suspected retained placental tissue/products of conception     Complications: None     Specimen:   ID Type Source Tests Collected by Time Destination   1 : 1. retained placenta Tissue Tissue SURGICAL PATHOLOGY TISSUE Jm Arredondo MD 2022  6:52 PM         Condition: Patient transferred to PACU in good condition        Summary of Case: [id number]     Hank Mehta MD, MD  2022  7:51 PM

## 2022-04-15 NOTE — DISCHARGE PLANNING
Patient was provided with discharge instructions, education, and follow up information; patient's spouse Loria Dakins was present for instructions with patient's consent. Prescriptions were already sent electronically to patient's pharmacy. Patient verbalizes understanding of follow up information, specifically medication prescribed, most common side effects, follow up appointments and review of post procedure instructions provided by Dr Candice Deshpande. Patient has no questions after reviewing all instructions and will be going home.      Tracey Perez, Discharge Leader [id number]

## 2022-04-15 NOTE — PLAN OF CARE
Pt refusing Aspirin and lipitor. Neuro aware. Discussed risks with patient. Pt refusing heparin. NIH/neuro exam negative. Weakness to left side resolved. Discharge if echo normal.     Problem: NEUROLOGICAL - ADULT  Goal: Achieves stable or improved neurological status  Description: INTERVENTIONS  - Assess for and report changes in neurological status  - Initiate measures to prevent increased intracranial pressure  - Maintain blood pressure and fluid volume within ordered parameters to optimize cerebral perfusion and minimize risk of hemorrhage  - Monitor temperature, glucose, and sodium.  Initiate appropriate interventions as ordered  Outcome: Progressing

## 2022-06-03 ENCOUNTER — TELEPHONE (OUTPATIENT)
Dept: OBGYN CLINIC | Facility: CLINIC | Age: 42
End: 2022-06-03

## 2022-06-07 NOTE — TELEPHONE ENCOUNTER
Spoke with pt and reminded pt of overdue TSH. Pt stated she does not need it and requested order to be cancelled.

## 2022-12-12 ENCOUNTER — TELEPHONE (OUTPATIENT)
Dept: OBGYN CLINIC | Facility: CLINIC | Age: 42
End: 2022-12-12

## 2022-12-12 NOTE — TELEPHONE ENCOUNTER
Per pt looking for a fertility specialist, states shes been unable to conceive.  Please advise if she gets a call back before 3 she can be reached at [phone]

## 2022-12-12 NOTE — TELEPHONE ENCOUNTER
Pt Name and  verified. Pt informed it would be best for her to come in and discuss her options with provider. Pt scheduled with MS Wednesday.

## 2022-12-14 ENCOUNTER — OFFICE VISIT (OUTPATIENT)
Dept: OBGYN CLINIC | Facility: CLINIC | Age: 42
End: 2022-12-14
Payer: COMMERCIAL

## 2022-12-14 VITALS — SYSTOLIC BLOOD PRESSURE: 127 MMHG | DIASTOLIC BLOOD PRESSURE: 72 MMHG | HEART RATE: 96 BPM

## 2022-12-14 DIAGNOSIS — Z01.419 ENCOUNTER FOR ANNUAL ROUTINE GYNECOLOGICAL EXAMINATION: Primary | ICD-10-CM

## 2022-12-14 DIAGNOSIS — Z31.9 PATIENT DESIRES PREGNANCY: ICD-10-CM

## 2022-12-14 DIAGNOSIS — Z11.3 SCREENING FOR STD (SEXUALLY TRANSMITTED DISEASE): ICD-10-CM

## 2022-12-14 DIAGNOSIS — Z12.4 SCREENING FOR MALIGNANT NEOPLASM OF CERVIX: ICD-10-CM

## 2022-12-14 DIAGNOSIS — Z12.31 SCREENING MAMMOGRAM FOR BREAST CANCER: ICD-10-CM

## 2022-12-14 PROCEDURE — 3078F DIAST BP <80 MM HG: CPT | Performed by: ADVANCED PRACTICE MIDWIFE

## 2022-12-14 PROCEDURE — 99396 PREV VISIT EST AGE 40-64: CPT | Performed by: ADVANCED PRACTICE MIDWIFE

## 2022-12-14 PROCEDURE — 3074F SYST BP LT 130 MM HG: CPT | Performed by: ADVANCED PRACTICE MIDWIFE

## 2022-12-15 LAB
C TRACH DNA SPEC QL NAA+PROBE: NEGATIVE
HPV I/H RISK 1 DNA SPEC QL NAA+PROBE: NEGATIVE
N GONORRHOEA DNA SPEC QL NAA+PROBE: NEGATIVE

## 2023-01-18 ENCOUNTER — TELEPHONE (OUTPATIENT)
Dept: OBGYN CLINIC | Facility: CLINIC | Age: 43
End: 2023-01-18

## 2023-03-06 DIAGNOSIS — N84.0 UTERINE POLYP: Primary | ICD-10-CM

## 2023-04-04 ENCOUNTER — OFFICE VISIT (OUTPATIENT)
Dept: OBGYN CLINIC | Facility: CLINIC | Age: 43
End: 2023-04-04
Payer: COMMERCIAL

## 2023-04-04 VITALS
HEIGHT: 63 IN | WEIGHT: 134 LBS | DIASTOLIC BLOOD PRESSURE: 60 MMHG | SYSTOLIC BLOOD PRESSURE: 98 MMHG | BODY MASS INDEX: 23.74 KG/M2

## 2023-04-04 DIAGNOSIS — N84.0 ENDOMETRIAL POLYP: Primary | ICD-10-CM

## 2023-04-04 PROCEDURE — 3008F BODY MASS INDEX DOCD: CPT | Performed by: OBSTETRICS & GYNECOLOGY

## 2023-04-04 PROCEDURE — 3078F DIAST BP <80 MM HG: CPT | Performed by: OBSTETRICS & GYNECOLOGY

## 2023-04-04 PROCEDURE — 99203 OFFICE O/P NEW LOW 30 MIN: CPT | Performed by: OBSTETRICS & GYNECOLOGY

## 2023-04-04 PROCEDURE — 3074F SYST BP LT 130 MM HG: CPT | Performed by: OBSTETRICS & GYNECOLOGY

## 2023-04-05 ENCOUNTER — TELEPHONE (OUTPATIENT)
Dept: OBGYN CLINIC | Facility: CLINIC | Age: 43
End: 2023-04-05

## 2023-04-05 DIAGNOSIS — N84.0 ENDOMETRIAL POLYP: Primary | ICD-10-CM

## 2023-04-05 NOTE — TELEPHONE ENCOUNTER
Scheduled 4/13 at 230      ----- Message from Rupal Hanna DO sent at 4/4/2023  5:11 PM CDT -----  Regarding: please schedule hysteroscopic polypectomy  Please schedule the following surgery:    Procedure: hysteroscopy with polypectomy (myosure lite)  Assist: na  Date: 4/13 PM, or 4/19 or 4/20 anytime, or 4/15 AM  Dx: endometrial polyp  Pre-op appt: na  Admission type: outpatient  Department of discharge(SDS/Floor): hospitals  Expected length of stay: na  Procedure length time (please enter amount you are requesting): 30 min  Recovery time (patients always ask): 1-2 days  Medical Clearance: (Y/N) no  Post- Op f/u appt time frame: 1 week    Please request IV sedation for anesthesia as patient has had adverse reaction to general with previous D&C. Thank you!   ~RD

## 2023-04-13 ENCOUNTER — HOSPITAL ENCOUNTER (OUTPATIENT)
Facility: HOSPITAL | Age: 43
Setting detail: HOSPITAL OUTPATIENT SURGERY
Discharge: HOME | End: 2023-04-13
Attending: OBSTETRICS & GYNECOLOGY | Admitting: OBSTETRICS & GYNECOLOGY
Payer: COMMERCIAL

## 2023-04-13 ENCOUNTER — ANESTHESIA (OUTPATIENT)
Dept: SURGERY | Facility: HOSPITAL | Age: 43
End: 2023-04-13
Payer: COMMERCIAL

## 2023-04-13 ENCOUNTER — ANESTHESIA EVENT (OUTPATIENT)
Dept: SURGERY | Facility: HOSPITAL | Age: 43
End: 2023-04-13
Payer: COMMERCIAL

## 2023-04-13 VITALS
DIASTOLIC BLOOD PRESSURE: 74 MMHG | HEIGHT: 63 IN | OXYGEN SATURATION: 100 % | SYSTOLIC BLOOD PRESSURE: 119 MMHG | WEIGHT: 132 LBS | HEART RATE: 76 BPM | TEMPERATURE: 99 F | BODY MASS INDEX: 23.39 KG/M2 | RESPIRATION RATE: 16 BRPM

## 2023-04-13 DIAGNOSIS — N84.0 ENDOMETRIAL POLYP: ICD-10-CM

## 2023-04-13 LAB — B-HCG UR QL: NEGATIVE

## 2023-04-13 PROCEDURE — 0UB98ZZ EXCISION OF UTERUS, VIA NATURAL OR ARTIFICIAL OPENING ENDOSCOPIC: ICD-10-PCS | Performed by: OBSTETRICS & GYNECOLOGY

## 2023-04-13 PROCEDURE — 58558 HYSTEROSCOPY BIOPSY: CPT | Performed by: OBSTETRICS & GYNECOLOGY

## 2023-04-13 RX ORDER — LIDOCAINE HYDROCHLORIDE 10 MG/ML
INJECTION, SOLUTION EPIDURAL; INFILTRATION; INTRACAUDAL; PERINEURAL AS NEEDED
Status: DISCONTINUED | OUTPATIENT
Start: 2023-04-13 | End: 2023-04-13 | Stop reason: SURG

## 2023-04-13 RX ORDER — ONDANSETRON 2 MG/ML
4 INJECTION INTRAMUSCULAR; INTRAVENOUS EVERY 8 HOURS PRN
OUTPATIENT
Start: 2023-04-13

## 2023-04-13 RX ORDER — ONDANSETRON 4 MG/1
4 TABLET, FILM COATED ORAL EVERY 8 HOURS PRN
OUTPATIENT
Start: 2023-04-13

## 2023-04-13 RX ORDER — MORPHINE SULFATE 4 MG/ML
2 INJECTION, SOLUTION INTRAMUSCULAR; INTRAVENOUS EVERY 10 MIN PRN
Status: DISCONTINUED | OUTPATIENT
Start: 2023-04-13 | End: 2023-04-13

## 2023-04-13 RX ORDER — SODIUM CHLORIDE, SODIUM LACTATE, POTASSIUM CHLORIDE, CALCIUM CHLORIDE 600; 310; 30; 20 MG/100ML; MG/100ML; MG/100ML; MG/100ML
INJECTION, SOLUTION INTRAVENOUS CONTINUOUS
Status: DISCONTINUED | OUTPATIENT
Start: 2023-04-13 | End: 2023-04-13

## 2023-04-13 RX ORDER — MORPHINE SULFATE 10 MG/ML
6 INJECTION, SOLUTION INTRAMUSCULAR; INTRAVENOUS EVERY 10 MIN PRN
Status: DISCONTINUED | OUTPATIENT
Start: 2023-04-13 | End: 2023-04-13

## 2023-04-13 RX ORDER — MORPHINE SULFATE 4 MG/ML
4 INJECTION, SOLUTION INTRAMUSCULAR; INTRAVENOUS EVERY 10 MIN PRN
Status: DISCONTINUED | OUTPATIENT
Start: 2023-04-13 | End: 2023-04-13

## 2023-04-13 RX ORDER — HYDROMORPHONE HYDROCHLORIDE 1 MG/ML
0.2 INJECTION, SOLUTION INTRAMUSCULAR; INTRAVENOUS; SUBCUTANEOUS EVERY 5 MIN PRN
Status: DISCONTINUED | OUTPATIENT
Start: 2023-04-13 | End: 2023-04-13

## 2023-04-13 RX ORDER — LIDOCAINE HYDROCHLORIDE 20 MG/ML
INJECTION, SOLUTION EPIDURAL; INFILTRATION; INTRACAUDAL; PERINEURAL AS NEEDED
Status: DISCONTINUED | OUTPATIENT
Start: 2023-04-13 | End: 2023-04-13 | Stop reason: HOSPADM

## 2023-04-13 RX ORDER — MIDAZOLAM HYDROCHLORIDE 1 MG/ML
INJECTION INTRAMUSCULAR; INTRAVENOUS AS NEEDED
Status: DISCONTINUED | OUTPATIENT
Start: 2023-04-13 | End: 2023-04-13 | Stop reason: SURG

## 2023-04-13 RX ORDER — NALOXONE HYDROCHLORIDE 0.4 MG/ML
80 INJECTION, SOLUTION INTRAMUSCULAR; INTRAVENOUS; SUBCUTANEOUS AS NEEDED
Status: DISCONTINUED | OUTPATIENT
Start: 2023-04-13 | End: 2023-04-13

## 2023-04-13 RX ORDER — HYDROMORPHONE HYDROCHLORIDE 1 MG/ML
0.4 INJECTION, SOLUTION INTRAMUSCULAR; INTRAVENOUS; SUBCUTANEOUS EVERY 5 MIN PRN
Status: DISCONTINUED | OUTPATIENT
Start: 2023-04-13 | End: 2023-04-13

## 2023-04-13 RX ORDER — KETOROLAC TROMETHAMINE 30 MG/ML
INJECTION, SOLUTION INTRAMUSCULAR; INTRAVENOUS AS NEEDED
Status: DISCONTINUED | OUTPATIENT
Start: 2023-04-13 | End: 2023-04-13 | Stop reason: SURG

## 2023-04-13 RX ORDER — ACETAMINOPHEN 500 MG
1000 TABLET ORAL ONCE
Status: COMPLETED | OUTPATIENT
Start: 2023-04-13 | End: 2023-04-13

## 2023-04-13 RX ORDER — HYDROMORPHONE HYDROCHLORIDE 1 MG/ML
0.6 INJECTION, SOLUTION INTRAMUSCULAR; INTRAVENOUS; SUBCUTANEOUS EVERY 5 MIN PRN
Status: DISCONTINUED | OUTPATIENT
Start: 2023-04-13 | End: 2023-04-13

## 2023-04-13 RX ORDER — ONDANSETRON 2 MG/ML
4 INJECTION INTRAMUSCULAR; INTRAVENOUS EVERY 6 HOURS PRN
Status: DISCONTINUED | OUTPATIENT
Start: 2023-04-13 | End: 2023-04-13

## 2023-04-13 RX ADMIN — MIDAZOLAM HYDROCHLORIDE 2 MG: 1 INJECTION INTRAMUSCULAR; INTRAVENOUS at 14:53:00

## 2023-04-13 RX ADMIN — KETOROLAC TROMETHAMINE 30 MG: 30 INJECTION, SOLUTION INTRAMUSCULAR; INTRAVENOUS at 15:15:00

## 2023-04-13 RX ADMIN — SODIUM CHLORIDE, SODIUM LACTATE, POTASSIUM CHLORIDE, CALCIUM CHLORIDE: 600; 310; 30; 20 INJECTION, SOLUTION INTRAVENOUS at 15:21:00

## 2023-04-13 RX ADMIN — SODIUM CHLORIDE, SODIUM LACTATE, POTASSIUM CHLORIDE, CALCIUM CHLORIDE: 600; 310; 30; 20 INJECTION, SOLUTION INTRAVENOUS at 14:51:00

## 2023-04-13 RX ADMIN — LIDOCAINE HYDROCHLORIDE 50 MG: 10 INJECTION, SOLUTION EPIDURAL; INFILTRATION; INTRACAUDAL; PERINEURAL at 14:54:00

## 2023-04-13 RX ADMIN — LIDOCAINE HYDROCHLORIDE 20 MG: 10 INJECTION, SOLUTION EPIDURAL; INFILTRATION; INTRACAUDAL; PERINEURAL at 14:59:00

## 2023-04-13 NOTE — OPERATIVE REPORT
OPERATIVE REPORT  [facility]    Pre-operative Diagnosis:  Endometrial polyp  Post-operative Diagnosis:  same  Procedure:  Hysteroscopy with poypectomy  Surgeon:  Frank Vance DO   Anesthesia:  IV sedation  Total IV fluids:  400 ml  Urine Output:  200 ml  Estimated blood loss:  35 mL  Specimens:  Endometrial polyp  Complications: none  Condition:  Stable to PACU  Findings:  Exam under anesthesia revealed, arielle sized, anteverted uterus. Cervix appeared closed. The cervix was dilated up to the 5mm Hegar dilator. Hysterscopy findings: Bilateral ostia appreciated, small polypoid ridge on anterior wall of uterus. Excellent hemostasis achieved. Technique: The patient was taken to the operating room after informed consent was obtained with IV running. Once anesthesia was initiated and found to be adequate the patient was placed in the dorsal lithotomy position in [address] [place] [address]. She underwent an exam under anesthesia with the findings noted above. The patient was prepped and draped in the normal sterile fashion for a vaginal procedure. Timeout was performed. The bladder was catheterized. A sterile speculum was placed in the patient's vagina with visualization of the entire cervix. Cervical block was administered with 2% lidocaine at the cervicovaginal junction at 12, 4 and 7 oclock. The anterior lip of the cervix was grasped with a single-toothed tenaculum. The cervix was then progressively dilated using up the #5mm Hegar dilator. The myosure omni hysteroscope was then inserted through the cervix into the uterine cavity. The uterine cavity was viewed with the findings noted above. The myosure lite device was used to remove the polyp. The hysteroscope was then withdrawn. All the instruments were removed from the patient's vagina including the tenaculum with excellent hemostasis noted at the tenaculum sites. The patient was awakened, extubated, and taken to the recovery room in stable condition.  She tolerated the procedure well.   Nadya Sims, DO

## 2023-04-13 NOTE — DISCHARGE INSTRUCTIONS
Post Operative Home Care Instructions     We hope you were pleased with your care at [facility].  We wish you the best outcome and overall experience. These instructions will help to minimize pain, limit the risk for an infection, and improve the likelihood of a successful recovery. What to Expect:  Abdominal cramping   Vaginal bleeding for about 1-2 weeks   Constipation is common after surgery. Please keep up with Colace twice per day and Miralax as needed. Over-The-Counter Medication  Non-prescription anti-inflammatory medications can also help to ease the pain. You may take Aleve, Tylenol or Ibuprofen   Colace or Metamucil for Constipation  Drink a full glass of water with oral medication and take as directed. Bathing/Showers  You may resume showers  No baths, swimming, hot tubs for at least 2 weeks. Home Medication  Resume your home medications as instructed    Diet   Resume your normal diet    Activity  Refrain from vaginal intercourse, vaginal suppositories, tampon use or douches until after your follow up visit   No exercising for 1 weeks  You may climb stairs     Return to Work or [place] may return to work in 1-2 days   Contact your gynecologist's office, if you need a medical release. ([phone])    Driving  Avoid driving if taking narcotics    Follow-up Appointment with Your Obstetrician  Call your Gynecologist's office today for a staple removal/incision check appointment and/or follow up appointment. The number is [phone]. Verify your appointment date, day, time, and location. At your office visit:  Your progress will be evaluated, pathology will be reviewed, and any additional concerns and instructions will be discussed.     Questions or Concerns  Call your gynecologist's office if you experience the following:  Severe pain not controlled by pain medication  Foul smelling vaginal discharge  Heavy bleeding  Shortness of breath  Fever  Crying and periods of sadness that prevents you from caring for yourself   Burning sensation during urination or inability to urinate  Swelling, redness or abnormal warmth to your leg/calf  Please call [phone]. If your call is made after office hours, a physician will be available to help you. There is always a provider covering our patients. Thank you for coming to [facility] for your surgery. The nurses, gynecologist, and the anesthesiologists try very hard to make sure you receive the best care possible. Your trust in them as well as us is greatly appreciated. Thanks so much,   The Providers of [facility]          HOME INSTRUCTIONS  [facility] HOME CARE INSTRUCTIONS: POST-OP ANESTHESIA  The medication that you received for sedation or general anesthesia can last up to 24 hours. Your judgment and reflexes may be altered, even if you feel like your normal self. We Recommend:   Do not drive any motor vehicle or bicycle   Avoid mowing the lawn, playing sports, or working with power tools/applicances (power saws, electric knives or mixers)   That you have someone stay with you on your first night home   Do not drink alcohol or take sleeping pills or tranquilizers   Do not sign legal documents within 24 hours of your procedure   If you had a nerve block for your surgery, take extra care not to put any pressure on your arm or hand for 24 hours    It is normal:  For you to have a sore throat if you had a breathing tube during surgery (while you were asleep!). The sore throat should get better within 48 hours. You can gargle with warm salt water (1/2 tsp in 4 oz warm water) or use a throat lozenge for comfort  To feel muscle aches or soreness especially in the abdomen, chest or neck. The achy feeling should go away in the next 24 hours  To feel weak, sleepy or \"wiped out\". Your should start feeling better in the next 24 hours.    To experience mild discomforts such as sore lip or tongue, headache, cramps, gas pains or a bloated feeling in your abdomen. To experience mild back pain or soreness for a day or two if you had spinal or epidural anesthesia. If you had laparoscopic surgery, to feel shoulder pain or discomfort on the day of surgery. For some patients to have nausea after surgery/anesthesia    If you feel nausea or experience vomiting:   Try to move around less.    Eat less than usual or drink only liquids until the next morning   Nausea should resolve in about 24 hours    If you have a problem when you are at home:    Call your surgeons office

## 2023-04-24 ENCOUNTER — OFFICE VISIT (OUTPATIENT)
Dept: OBGYN CLINIC | Facility: CLINIC | Age: 43
End: 2023-04-24
Payer: COMMERCIAL

## 2023-04-24 VITALS
SYSTOLIC BLOOD PRESSURE: 102 MMHG | HEIGHT: 63 IN | DIASTOLIC BLOOD PRESSURE: 58 MMHG | BODY MASS INDEX: 23.74 KG/M2 | WEIGHT: 134 LBS

## 2023-04-24 DIAGNOSIS — Z09 POSTOPERATIVE EXAMINATION: Primary | ICD-10-CM

## 2023-04-27 ENCOUNTER — PATIENT MESSAGE (OUTPATIENT)
Dept: OBGYN CLINIC | Facility: CLINIC | Age: 43
End: 2023-04-27

## 2023-10-09 ENCOUNTER — PATIENT MESSAGE (OUTPATIENT)
Dept: OBGYN CLINIC | Facility: CLINIC | Age: 43
End: 2023-10-09

## 2023-10-09 NOTE — TELEPHONE ENCOUNTER
From: Emily Peter  To: Sekou Faye  Sent: 10/9/2023 7:26 AM CDT  Subject: Pap smear     Maricarmen Clemente   Can you please fax the results of my most recent pap smear to dr. Gayatri Castillo at [facility] at [phone].     thank you

## 2023-10-30 NOTE — ADDENDUM NOTE
Encounter addended by: Florencio Bacon on: 2/18/2019 2:40 PM   Actions taken: Visit diagnoses modified, Charge Capture section accepted No No

## (undated) DIAGNOSIS — O03.9 SPONTANEOUS ABORTION: ICD-10-CM

## (undated) DIAGNOSIS — O03.4 RETAINED PRODUCTS OF CONCEPTION FOLLOWING ABORTION: Primary | ICD-10-CM

## (undated) DIAGNOSIS — O03.4 INCOMPLETE SPONTANEOUS ABORTION: Primary | ICD-10-CM

## (undated) DIAGNOSIS — O03.4 RETAINED PRODUCTS OF CONCEPTION AFTER MISCARRIAGE: Primary | ICD-10-CM

## (undated) DIAGNOSIS — O03.9 SAB (SPONTANEOUS ABORTION): Primary | ICD-10-CM

## (undated) DIAGNOSIS — O03.4 RETAINED PRODUCTS OF CONCEPTION AFTER MISCARRIAGE: ICD-10-CM

## (undated) DEVICE — SOL  .9 3000ML

## (undated) DEVICE — HYSTEROSCOPY: Brand: MEDLINE INDUSTRIES, INC.

## (undated) DEVICE — GAMMEX® PI HYBRID SIZE 7, STERILE POWDER-FREE SURGICAL GLOVE, POLYISOPRENE AND NEOPRENE BLEND: Brand: GAMMEX

## (undated) DEVICE — SOCK CNSTR 4IN TNPSL UNV SPEC

## (undated) DEVICE — MYOSURE SINGLE USE SEAL SET

## (undated) DEVICE — 1016 S-DRAPE IRRIG POUCH 10/BOX: Brand: STERI-DRAPE™

## (undated) DEVICE — COVER SGL STRL LGHT HNDL BLU

## (undated) DEVICE — DEVICE SPEC RTRVL MYOSURE

## (undated) DEVICE — SOLUTION  .9 3000ML

## (undated) DEVICE — SUCTION CANISTER, 3000CC,SAFELINER: Brand: DEROYAL

## (undated) DEVICE — WOLF INFLOW HYSTER

## (undated) DEVICE — STERILE SURGICAL LUBRICANT, METAL TUBE: Brand: SURGILUBE

## (undated) DEVICE — SET TUBI Y FL CNTRL INFL/OTFL

## (undated) DEVICE — STERILE POLYISOPRENE POWDER-FREE SURGICAL GLOVES WITH EMOLLIENT COATING: Brand: PROTEXIS

## (undated) DEVICE — MYOSURE LITE BLADE

## (undated) DEVICE — MEDI-VAC NON-CONDUCTIVE SUCTION TUBING: Brand: CARDINAL HEALTH

## (undated) DEVICE — LEGGINGS SRG 48X31IN CUF STRL

## (undated) DEVICE — GOWN SURG AERO BLUE PERF LG

## (undated) NOTE — MR AVS SNAPSHOT
After Visit Summary   12/14/2022    Elena Friday   MRN: [id number]           Visit Information     Date & Time  12/14/2022  7:00 PM Provider  Олег Oliveira, [address] [facility], [address], [facility]/[employer]. Phone  [phone] Were  Most recent update: 12/14/2022  7:11 PM    BP   127/72 (BP Location: Left arm, Patient Position: Sitting)    Pulse   96    LMP   12/10/2022           Allergies as of 12/14/2022  Review status set to Review Complete on 12/14/2022       Noted Reaction Type Reactions    Seasonal 09/22/2018    Runny nose      Your Current Medications        Dosage    PRENATAL VIT-FE SULFATE-FA-DHA OR Take 1 tablet by mouth daily. Diagnoses for This Visit    Encounter for annual routine gynecological examination   [5420302]  -  Primary  Screening for STD (sexually transmitted disease)   [923212]    Screening for malignant neoplasm of cervix   [577043]    Screening mammogram for breast cancer   [8192656]    Patient desires pregnancy   [299920]             We Ordered the Following     Normal Orders This Visit    CHLAMYDIA/GONOCOCCUS, MALATHI [4228827 CUSTOM]     HPV HIGH RISK , THIN PREP COLLECTION [WGR2600 CUSTOM]     THINPREP PAP SMEAR B [EAC1920 CUSTOM]     THINPREP PAP SMEAR ONLY [[id number] CUSTOM]     Future Labs/Procedures Expected by Expires    HPV HIGH RISK , THIN PREP COLLECTION [[id number] CUSTOM]  12/14/2022 12/14/2023    TANO KRISTINA 2D+3D SCREENING BILAT (CPT=77067/56942) [COMBO CPT(R)]  12/14/2022 (Approximate) 12/14/2023    THINPREP PAP SMEAR B [[id number] CUSTOM]  12/14/2022 12/14/2023      Imaging Scheduling Instructions     Around December 14, 2022   Imaging:   TANO KRISTINA 2D+3D SCREENING BILAT (CQA=53984/27157)    Instructions: Your order will generate a \"Scheduling Ticket\" that will be available in "[employer] to schedule on your own at a time most convenient to you.       If you do not have a "[employer] Account, or if you prefer to speak with someone to schedule your appointment, please call Kacy Huitron Scheduling at [phone]. December 15, 2022      [address] [place] [address]     Dear Flakito Polo : Thank you for enrolling in [address] Please follow the instructions below to securely access your online medical record. [employer] allows you to send messages to your doctor, view test results, renew prescriptions and request appointments. How Do I Sign Up? 1. In your Internet browser, go to [email or web address]  2. Click on the Activate your Account if you have an activation code in the box under the *New User? section. 3. Enter your [employer] Activation Code exactly as it appears below. You will not need to use this code after you have completed the sign-up process. If you do not sign up before the expiration date, you must request a new code. [employer] Activation Code: [email or web address]  Expires: 1/28/2023  7:42 PM    4. Enter your Zip Code and Date of Birth (mm/dd/yyyy) as indicated and click Next. You will be taken to the next sign-up page. 5. Create a [employer] Username. This will be your [employer] login Username and cannot be changed, so think of one that is secure and easy to remember. 6. Create a [employer] password. You can change your password at any time. 7. Choose a Security Question and enter your Answer and click Next. This can be used at a later time if you forget your password. 8. Enter your e-mail address. You will receive e-mail notification when new information is available in [address] 9. Click Sign In. You can now view your medical record. Additional Information  If you have questions, you can call [phone] to talk to our [address] staff. Remember, [employer] is NOT to be used for urgent needs. For medical emergencies, dial 911.     Sincerely,    Ron Jack CNM              Did you know that [facility] primary care physicians now offer Video Visits through [address] for adult patients for a variety of conditions such as allergies, back pain and cold symptoms? Skip the drive and waiting room and online chat with a doctor face-to-face using your web-cam enabled computer or mobile device wherever you are. Video Visits cost $50 and can be paid hassle-free using a credit, debit, or health savings card. Not active on [employer]? Ask us how to get signed up today! If you receive a survey from [employer], please take a few minutes to complete it and provide feedback. We strive to deliver the best patient experience and are looking for ways to make improvements. Your feedback will help us do so. For more information on [employer], please visit [email or web address]           No text in SmartText           No text in SmartText

## (undated) NOTE — LETTER
3/11/2019              James Alvarado, [address]        [facility] [id number]         To Whom It May Concern,     This letter is to inform that Melody Grandchild is under my medical care for her pregnancy.  Her estimated jennifer

## (undated) NOTE — LETTER
VACCINE ADMINISTRATION RECORD  PARENT / GUARDIAN APPROVAL  Date: 2019  Vaccine administered to: Daniel Paulson     : 1980    MRN: [id number]    A copy of the appropriate Centers for Disease Control and Prevention Vaccine Information stateme

## (undated) NOTE — Clinical Note
Weekly N[place] at [facility]

## (undated) NOTE — Clinical Note
Normal level 2 ultrasound. Weekly NST at 36 weeksThird trimester USThyroid labs to be managed by Dr. Herminio Crowley

## (undated) NOTE — LETTER
10/9/2018              [address]        [address] [place] [id number]         Dear Jagdish Hicks,    It was a pleasure to see you.   Your PAP test with HPV was normal. Current guidelines would recommend a repeat Pap with HPV in 5 yea